# Patient Record
Sex: FEMALE | Race: WHITE | HISPANIC OR LATINO | Employment: FULL TIME | URBAN - METROPOLITAN AREA
[De-identification: names, ages, dates, MRNs, and addresses within clinical notes are randomized per-mention and may not be internally consistent; named-entity substitution may affect disease eponyms.]

---

## 2017-02-08 ENCOUNTER — HOSPITAL ENCOUNTER (OUTPATIENT)
Dept: LAB | Facility: MEDICAL CENTER | Age: 27
End: 2017-02-08
Attending: OBSTETRICS & GYNECOLOGY
Payer: COMMERCIAL

## 2017-02-08 LAB — B-HCG SERPL-ACNC: <0.6 MIU/ML (ref 0–5)

## 2017-02-08 PROCEDURE — 36415 COLL VENOUS BLD VENIPUNCTURE: CPT

## 2017-02-08 PROCEDURE — 84702 CHORIONIC GONADOTROPIN TEST: CPT

## 2018-09-04 ENCOUNTER — OFFICE VISIT (OUTPATIENT)
Dept: URGENT CARE | Facility: PHYSICIAN GROUP | Age: 28
End: 2018-09-04
Payer: COMMERCIAL

## 2018-09-04 VITALS
DIASTOLIC BLOOD PRESSURE: 86 MMHG | WEIGHT: 154 LBS | TEMPERATURE: 97.4 F | BODY MASS INDEX: 24.75 KG/M2 | HEIGHT: 66 IN | RESPIRATION RATE: 18 BRPM | SYSTOLIC BLOOD PRESSURE: 122 MMHG | HEART RATE: 90 BPM | OXYGEN SATURATION: 97 %

## 2018-09-04 DIAGNOSIS — J01.00 ACUTE MAXILLARY SINUSITIS, RECURRENCE NOT SPECIFIED: ICD-10-CM

## 2018-09-04 PROCEDURE — 99204 OFFICE O/P NEW MOD 45 MIN: CPT | Performed by: FAMILY MEDICINE

## 2018-09-04 RX ORDER — AMOXICILLIN AND CLAVULANATE POTASSIUM 875; 125 MG/1; MG/1
1 TABLET, FILM COATED ORAL 2 TIMES DAILY
Qty: 14 TAB | Refills: 0 | Status: SHIPPED | OUTPATIENT
Start: 2018-09-04 | End: 2018-09-11

## 2018-09-04 RX ORDER — FLUTICASONE PROPIONATE 50 MCG
1 SPRAY, SUSPENSION (ML) NASAL 2 TIMES DAILY
Qty: 1 BOTTLE | Refills: 0 | Status: SHIPPED | OUTPATIENT
Start: 2018-09-04 | End: 2020-03-03

## 2018-09-04 RX ORDER — IBUPROFEN 200 MG
200 TABLET ORAL EVERY 6 HOURS PRN
COMMUNITY
End: 2020-03-03

## 2018-09-04 ASSESSMENT — PAIN SCALES - GENERAL: PAINLEVEL: NO PAIN

## 2018-09-04 NOTE — LETTER
September 4, 2018         Patient: Grecia Mcgrath   YOB: 1990   Date of Visit: 9/4/2018           To Whom it May Concern:    Grecia Mcgrath was seen in my clinic on 9/4/2018. She may return to work on Monday..    If you have any questions or concerns, please don't hesitate to call.        Sincerely,           Dimitris Centeno M.D.  Electronically Signed

## 2018-09-05 NOTE — PROGRESS NOTES
"Chief Complaint   Patient presents with   • Fever     cough, [id number]         Sinusitis  This is a new problem. The current episode started in the past 7 days. The problem has been gradually worsening since onset. There has been no fever. Associated symptoms include congestion, coughing, headaches and sinus pressure. Pertinent negatives include no sneezing or sore throat. Past treatments include nothing.      Past medical history was unremarkable and not pertinent to current issue  Social hx - denies tobacco, alcohol, drug use  Family hx was reviewed - no pertinent past family hx      Review of Systems:  Review of Systems   Constitutional: Negative for fever, chills.   HENT: no neck pain,  or dizziness  Eyes: denies vision changes or discharge  Respiratory: + cough, congestion.   Denies SOB  Cardiovascular: denies palpations, chest pain   Gastrointestinal: denies diarrhea, abdominal pain or constipation.  No blood in stool.  Musculoskeletal: denies back pain or joint pain    Skin: no itching or rash  Neurological: No numbness or tingling.   Psych - denies depression, anxiety   - Denies dysuria, frequency or discharge  10 point ROS otherwise negative, except per HPI            Objective:     Blood pressure 122/86, pulse 90, temperature 36.3 °C (97.4 °F), resp. rate 18, height 1.676 m (5' 6\"), weight 69.9 kg (154 lb), SpO2 97 %.      Physical Exam   Constitutional: patient is oriented to person, place, and time. Patient appears well-developed and well-nourished.   HENT:   Head: Normocephalic and atraumatic.   Right Ear: Hearing, tympanic membrane and external ear normal.   Left Ear: Hearing, tympanic membrane and external ear normal.   Nose: Mucosal edema and rhinorrhea present.  . No epistaxis.  Left and right sinus tenderness noted  Mouth/Throat: Uvula is midline and mucous membranes are normal. Oropharyngeal exudate present. No posterior oropharyngeal edema or posterior oropharyngeal erythema.   Eyes: " Conjunctivae and EOM are normal. Pupils are equal, round, and reactive to light. Left and right eyes - no discharge. No scleral icterus.   Neck: Normal range of motion. Neck supple. No JVD present. No tracheal deviation present. No thyromegaly present.   Cardiovascular: Normal rate, regular rhythm, normal heart sounds and intact distal pulses.    No murmur heard.  Pulmonary/Chest: Breath sounds normal. No respiratory distress.   no wheezes, rales.      Musculoskeletal: Normal range of motion.   no edema.   Lymphadenopathy:     There is positive  cervical adenopathy.   Neurological:   alert and oriented to person, place, and time.   Skin: Skin is warm and dry. No erythema.   Psychiatric:   normal mood and affect.  behavior is normal.   Nursing note and vitals reviewed.          Assessment/Plan:     1. Acute maxillary sinusitis, recurrence not specified     - amoxicillin-clavulanate (AUGMENTIN) 875-125 MG Tab; Take 1 Tab by mouth 2 times a day for 7 days.  Dispense: 14 Tab; Refill: 0  - fluticasone (FLONASE) 50 MCG/ACT nasal spray; Spray 1 Spray in nose 2 times a day.  Dispense: 1 Bottle; Refill: 0      Follow up in one week if no improvement, sooner if symptoms worsen.

## 2018-09-07 ENCOUNTER — HOSPITAL ENCOUNTER (OUTPATIENT)
Dept: LAB | Facility: MEDICAL CENTER | Age: 28
End: 2018-09-07
Attending: INTERNAL MEDICINE
Payer: COMMERCIAL

## 2018-09-07 ENCOUNTER — HOSPITAL ENCOUNTER (OUTPATIENT)
Dept: LAB | Facility: MEDICAL CENTER | Age: 28
End: 2018-09-07
Payer: COMMERCIAL

## 2018-09-07 LAB
25(OH)D3 SERPL-MCNC: 20 NG/ML (ref 30–100)
ALBUMIN SERPL BCP-MCNC: 4.3 G/DL (ref 3.2–4.9)
ALBUMIN/GLOB SERPL: 1.5 G/DL
ALP SERPL-CCNC: 65 U/L (ref 30–99)
ALT SERPL-CCNC: 11 U/L (ref 2–50)
ANION GAP SERPL CALC-SCNC: 7 MMOL/L (ref 0–11.9)
AST SERPL-CCNC: 15 U/L (ref 12–45)
BASOPHILS # BLD AUTO: 0.4 % (ref 0–1.8)
BASOPHILS # BLD: 0.03 K/UL (ref 0–0.12)
BDY FAT % MEASURED: 28.3 %
BILIRUB SERPL-MCNC: 0.5 MG/DL (ref 0.1–1.5)
BP DIAS: 63 MMHG
BP SYS: 118 MMHG
BUN SERPL-MCNC: 19 MG/DL (ref 8–22)
CALCIUM SERPL-MCNC: 9.9 MG/DL (ref 8.5–10.5)
CHLORIDE SERPL-SCNC: 104 MMOL/L (ref 96–112)
CHOLEST SERPL-MCNC: 151 MG/DL (ref 100–199)
CO2 SERPL-SCNC: 26 MMOL/L (ref 20–33)
CREAT SERPL-MCNC: 0.75 MG/DL (ref 0.5–1.4)
DIABETES HTDIA: NO
EOSINOPHIL # BLD AUTO: 0.05 K/UL (ref 0–0.51)
EOSINOPHIL NFR BLD: 0.7 % (ref 0–6.9)
ERYTHROCYTE [DISTWIDTH] IN BLOOD BY AUTOMATED COUNT: 42.4 FL (ref 35.9–50)
EVENT NAME HTEVT: NORMAL
FASTING HTFAS: YES
GLOBULIN SER CALC-MCNC: 2.9 G/DL (ref 1.9–3.5)
GLUCOSE SERPL-MCNC: 92 MG/DL (ref 65–99)
GLUCOSE SERPL-MCNC: 92 MG/DL (ref 65–99)
HCT VFR BLD AUTO: 45.8 % (ref 37–47)
HDLC SERPL-MCNC: 54 MG/DL
HGB BLD-MCNC: 14.7 G/DL (ref 12–16)
HYPERTENSION HTHYP: NO
IMM GRANULOCYTES # BLD AUTO: 0.07 K/UL (ref 0–0.11)
IMM GRANULOCYTES NFR BLD AUTO: 1 % (ref 0–0.9)
LDLC SERPL CALC-MCNC: 74 MG/DL
LYMPHOCYTES # BLD AUTO: 2.32 K/UL (ref 1–4.8)
LYMPHOCYTES NFR BLD: 31.9 % (ref 22–41)
MCH RBC QN AUTO: 29.8 PG (ref 27–33)
MCHC RBC AUTO-ENTMCNC: 32.1 G/DL (ref 33.6–35)
MCV RBC AUTO: 92.9 FL (ref 81.4–97.8)
MONOCYTES # BLD AUTO: 0.48 K/UL (ref 0–0.85)
MONOCYTES NFR BLD AUTO: 6.6 % (ref 0–13.4)
NEUTROPHILS # BLD AUTO: 4.33 K/UL (ref 2–7.15)
NEUTROPHILS NFR BLD: 59.4 % (ref 44–72)
NRBC # BLD AUTO: 0 K/UL
NRBC BLD-RTO: 0 /100 WBC
PLATELET # BLD AUTO: 348 K/UL (ref 164–446)
PMV BLD AUTO: 9.7 FL (ref 9–12.9)
POTASSIUM SERPL-SCNC: 4 MMOL/L (ref 3.6–5.5)
PROLACTIN SERPL-MCNC: 36.51 NG/ML (ref 2.8–26)
PROT SERPL-MCNC: 7.2 G/DL (ref 6–8.2)
RBC # BLD AUTO: 4.93 M/UL (ref 4.2–5.4)
SCREENING LOC CITY HTCIT: NORMAL
SCREENING LOC STATE HTSTA: NORMAL
SCREENING LOCATION HTLOC: NORMAL
SMOKING HTSMO: NO
SODIUM SERPL-SCNC: 137 MMOL/L (ref 135–145)
SUBSCRIBER ID HTSID: NORMAL
T4 FREE SERPL-MCNC: 0.95 NG/DL (ref 0.53–1.43)
TRIGL SERPL-MCNC: 116 MG/DL (ref 0–149)
TSH SERPL DL<=0.005 MIU/L-ACNC: 1.34 UIU/ML (ref 0.38–5.33)
WBC # BLD AUTO: 7.3 K/UL (ref 4.8–10.8)

## 2018-09-07 PROCEDURE — 80061 LIPID PANEL: CPT

## 2018-09-07 PROCEDURE — S5190 WELLNESS ASSESSMENT BY NONPH: HCPCS

## 2018-09-07 PROCEDURE — 82306 VITAMIN D 25 HYDROXY: CPT

## 2018-09-07 PROCEDURE — 80053 COMPREHEN METABOLIC PANEL: CPT

## 2018-09-07 PROCEDURE — 82947 ASSAY GLUCOSE BLOOD QUANT: CPT

## 2018-09-07 PROCEDURE — 84443 ASSAY THYROID STIM HORMONE: CPT

## 2018-09-07 PROCEDURE — 84146 ASSAY OF PROLACTIN: CPT

## 2018-09-07 PROCEDURE — 85025 COMPLETE CBC W/AUTO DIFF WBC: CPT

## 2018-09-07 PROCEDURE — 83013 H PYLORI (C-13) BREATH: CPT

## 2018-09-07 PROCEDURE — 84439 ASSAY OF FREE THYROXINE: CPT

## 2018-09-07 PROCEDURE — 36415 COLL VENOUS BLD VENIPUNCTURE: CPT

## 2018-09-08 LAB — UREA BREATH TEST QL: NEGATIVE

## 2020-03-02 ENCOUNTER — HOSPITAL ENCOUNTER (EMERGENCY)
Facility: MEDICAL CENTER | Age: 30
End: 2020-03-03
Attending: EMERGENCY MEDICINE
Payer: MEDICAID

## 2020-03-02 ENCOUNTER — APPOINTMENT (OUTPATIENT)
Dept: RADIOLOGY | Facility: MEDICAL CENTER | Age: 30
End: 2020-03-02
Attending: EMERGENCY MEDICINE
Payer: MEDICAID

## 2020-03-02 DIAGNOSIS — O20.0 ABORTION, THREATENED: ICD-10-CM

## 2020-03-02 DIAGNOSIS — O20.9 VAGINAL BLEEDING IN PREGNANCY, FIRST TRIMESTER: ICD-10-CM

## 2020-03-02 LAB
ANION GAP SERPL CALC-SCNC: 8 MMOL/L (ref 0–11.9)
BASOPHILS # BLD AUTO: 0.5 % (ref 0–1.8)
BASOPHILS # BLD: 0.07 K/UL (ref 0–0.12)
BUN SERPL-MCNC: 20 MG/DL (ref 8–22)
CALCIUM SERPL-MCNC: 9.3 MG/DL (ref 8.5–10.5)
CHLORIDE SERPL-SCNC: 102 MMOL/L (ref 96–112)
CO2 SERPL-SCNC: 25 MMOL/L (ref 20–33)
CREAT SERPL-MCNC: 0.72 MG/DL (ref 0.5–1.4)
EOSINOPHIL # BLD AUTO: 0.09 K/UL (ref 0–0.51)
EOSINOPHIL NFR BLD: 0.7 % (ref 0–6.9)
ERYTHROCYTE [DISTWIDTH] IN BLOOD BY AUTOMATED COUNT: 43.5 FL (ref 35.9–50)
GLUCOSE SERPL-MCNC: 85 MG/DL (ref 65–99)
HCT VFR BLD AUTO: 39.2 % (ref 37–47)
HGB BLD-MCNC: 13.1 G/DL (ref 12–16)
IMM GRANULOCYTES # BLD AUTO: 0.09 K/UL (ref 0–0.11)
IMM GRANULOCYTES NFR BLD AUTO: 0.7 % (ref 0–0.9)
LYMPHOCYTES # BLD AUTO: 3.27 K/UL (ref 1–4.8)
LYMPHOCYTES NFR BLD: 23.6 % (ref 22–41)
MCH RBC QN AUTO: 30.8 PG (ref 27–33)
MCHC RBC AUTO-ENTMCNC: 33.4 G/DL (ref 33.6–35)
MCV RBC AUTO: 92.2 FL (ref 81.4–97.8)
MONOCYTES # BLD AUTO: 1.05 K/UL (ref 0–0.85)
MONOCYTES NFR BLD AUTO: 7.6 % (ref 0–13.4)
NEUTROPHILS # BLD AUTO: 9.27 K/UL (ref 2–7.15)
NEUTROPHILS NFR BLD: 66.9 % (ref 44–72)
NRBC # BLD AUTO: 0 K/UL
NRBC BLD-RTO: 0 /100 WBC
NUMBER OF RH DOSES IND 8505RD: NORMAL
PLATELET # BLD AUTO: 336 K/UL (ref 164–446)
PMV BLD AUTO: 9.5 FL (ref 9–12.9)
POTASSIUM SERPL-SCNC: 3.6 MMOL/L (ref 3.6–5.5)
RBC # BLD AUTO: 4.25 M/UL (ref 4.2–5.4)
RH BLD: NORMAL
SODIUM SERPL-SCNC: 135 MMOL/L (ref 135–145)
WBC # BLD AUTO: 13.8 K/UL (ref 4.8–10.8)

## 2020-03-02 PROCEDURE — 84702 CHORIONIC GONADOTROPIN TEST: CPT

## 2020-03-02 PROCEDURE — 85025 COMPLETE CBC W/AUTO DIFF WBC: CPT

## 2020-03-02 PROCEDURE — 76801 OB US < 14 WKS SINGLE FETUS: CPT

## 2020-03-02 PROCEDURE — 80048 BASIC METABOLIC PNL TOTAL CA: CPT

## 2020-03-02 PROCEDURE — 86901 BLOOD TYPING SEROLOGIC RH(D): CPT

## 2020-03-02 PROCEDURE — 99284 EMERGENCY DEPT VISIT MOD MDM: CPT

## 2020-03-02 ASSESSMENT — PAIN DESCRIPTION - DESCRIPTORS: DESCRIPTORS: CRAMPING;ACHING

## 2020-03-02 ASSESSMENT — FIBROSIS 4 INDEX: FIB4 SCORE: 0.4

## 2020-03-03 ENCOUNTER — APPOINTMENT (OUTPATIENT)
Dept: RADIOLOGY | Facility: MEDICAL CENTER | Age: 30
End: 2020-03-03
Attending: EMERGENCY MEDICINE
Payer: COMMERCIAL

## 2020-03-03 ENCOUNTER — HOSPITAL ENCOUNTER (EMERGENCY)
Facility: MEDICAL CENTER | Age: 30
End: 2020-03-03
Attending: EMERGENCY MEDICINE
Payer: COMMERCIAL

## 2020-03-03 VITALS
OXYGEN SATURATION: 97 % | RESPIRATION RATE: 15 BRPM | TEMPERATURE: 98.1 F | SYSTOLIC BLOOD PRESSURE: 129 MMHG | BODY MASS INDEX: 26.23 KG/M2 | HEIGHT: 67 IN | HEART RATE: 89 BPM | WEIGHT: 167.11 LBS | DIASTOLIC BLOOD PRESSURE: 75 MMHG

## 2020-03-03 VITALS
DIASTOLIC BLOOD PRESSURE: 65 MMHG | RESPIRATION RATE: 18 BRPM | HEIGHT: 67 IN | BODY MASS INDEX: 26.54 KG/M2 | SYSTOLIC BLOOD PRESSURE: 126 MMHG | OXYGEN SATURATION: 99 % | WEIGHT: 169.09 LBS | HEART RATE: 82 BPM | TEMPERATURE: 98.6 F

## 2020-03-03 DIAGNOSIS — O03.4 INEVITABLE ABORTION: ICD-10-CM

## 2020-03-03 LAB
B-HCG SERPL-ACNC: 7497 MIU/ML (ref 0–10)
B-HCG SERPL-ACNC: ABNORMAL MIU/ML (ref 0–5)

## 2020-03-03 PROCEDURE — 84702 CHORIONIC GONADOTROPIN TEST: CPT

## 2020-03-03 PROCEDURE — 36415 COLL VENOUS BLD VENIPUNCTURE: CPT

## 2020-03-03 PROCEDURE — 76801 OB US < 14 WKS SINGLE FETUS: CPT

## 2020-03-03 PROCEDURE — 99284 EMERGENCY DEPT VISIT MOD MDM: CPT

## 2020-03-03 ASSESSMENT — LIFESTYLE VARIABLES: DO YOU DRINK ALCOHOL: NO

## 2020-03-03 ASSESSMENT — FIBROSIS 4 INDEX: FIB4 SCORE: 0.42

## 2020-03-03 NOTE — ED PROVIDER NOTES
"ED Provider Note    Scribed for Agustín Pelaez M.D. by Agustín Pelaez M.D.. 3/2/2020,  10:05 PM.    CHIEF COMPLAINT  Chief Complaint   Patient presents with   • Vaginal Bleeding     Pt states she is experiencing vaginal bleeding, it appears dark in color \"like old blood.\"  Bleeding started about 1 hour ago.     • Pregnancy     Pt states she is 7 weeks pregnant.     • Abdominal Cramping     abd cramping and back pain has been occurring x 48 hours.       HPI  Grecia Mcgrath is a 29 y.o. female who presents to the Emergency Department out of concern for vaginal bleeding and discomfort in first trimester pregnancy.  Her last menstrual period was December 7.  She is a G1, P0.  She said that she has had a day or 2 low back soreness and low midline abdominal cramping, for the most 48 hours.  The cramps are about the same as a menstrual cycle.  Today, in the bathroom, she noticed a small streak of dark blood on her underwear.  This was about 1 hour prior to arrival.  She does not think she has passed any clots or tissue.  She has not had any bright blood.  She denies fevers or chills or nausea or vomiting, abdominal trauma, abdominal surgeries, constipation or diarrhea or dysuria.    REVIEW OF SYSTEMS  See HPI for further details. All other systems are negative.     PAST MEDICAL HISTORY   Gastric ulcers.  No diabetes or immunosuppression.    SOCIAL HISTORY  Social History     Tobacco Use   • Smoking status: Never Smoker   • Smokeless tobacco: Never Used   Substance and Sexual Activity   • Alcohol use: No   • Drug use: Never   • Sexual activity: Not on file     Social History     Substance and Sexual Activity   Drug Use Never       SURGICAL HISTORY  patient denies any surgical history    CURRENT MEDICATIONS  Home Medications     Reviewed by Heather Beach R.N. (Registered Nurse) on 03/02/20 at 2130  Med List Status: Not Addressed   Medication Last Dose Status   fluticasone (FLONASE) 50 MCG/ACT nasal spray not " "taking Active   ibuprofen (MOTRIN) 200 MG Tab not taking Active                ALLERGIES  No Known Allergies    PHYSICAL EXAM  VITAL SIGNS: /65   Pulse 82   Temp 37 °C (98.6 °F)   Resp 18   Ht 1.702 m (5' 7\")   Wt 76.7 kg (169 lb 1.5 oz)   SpO2 99%   BMI 26.48 kg/m²   Pulse ox interpretation: I interpret this pulse ox as normal.  Constitutional: Alert in no apparent distress.  HENT: No signs of trauma, Bilateral external ears normal, Nose normal.   Eyes: Conjunctiva normal, Non-icteric.   Neck: Normal range of motion, Supple, No stridor.   Lymphatic: No lymphadenopathy noted.   Cardiovascular: Regular rate and rhythm, no murmurs.   Thorax & Lungs: Normal breath sounds, No respiratory distress, No wheezing, No chest tenderness.   Abdomen: Bowel sounds normal, Soft, mild suprapubic tenderness, No masses, No pulsatile masses. No peritoneal signs.  Skin: Warm, Dry, No erythema, No rash.   Extremities: Intact distal pulses, No edema, No cyanosis.  Musculoskeletal: Good range of motion in all major joints. No or major deformities noted.   Neurologic: Alert , Normal motor function, Normal sensory function, No focal deficits noted.   Psychiatric: Affect normal, Judgment normal, Mood normal.     DIAGNOSTIC STUDIES / PROCEDURES    LABS  Labs Reviewed   CBC WITH DIFFERENTIAL - Abnormal; Notable for the following components:       Result Value    WBC 13.8 (*)     MCHC 33.4 (*)     Neutrophils (Absolute) 9.27 (*)     Monos (Absolute) 1.05 (*)     All other components within normal limits    Narrative:     Print Consent?->No   HCG QUANTITATIVE - Abnormal; Notable for the following components:    Bhcg 87287.6 (*)     All other components within normal limits    Narrative:     Print Consent?->No   BASIC METABOLIC PANEL    Narrative:     Print Consent?->No   RH TYPE FOR RHOGAM FROM E.D.    Narrative:     Print Consent?->No   ESTIMATED GFR    Narrative:     Print Consent?->No     All labs reviewed by " me.    RADIOLOGY  US-OB 1ST TRIMESTER WITH TRANSVAGINAL (COMBO)   Final Result      Intrauterine embryo which by crown-rump length is six weeks one day gestational age. No fetal heart motions were detected however, raising the possibility of demise.        The radiologist's interpretation of all radiological studies have been reviewed by me.    COURSE & MEDICAL DECISION MAKING  Nursing notes, VS, PMSFHx reviewed in chart.     10:05 PM Patient seen and examined at bedside. Differential diagnosis includes but is not limited to incomplete or inevitable miscarriage, first trimester bleeding, abdominal pain and pregnancy. Ordered for screening labs, and transvaginal ultrasound to evaluate.     12:27 AM this patient's ultrasound is concerning for an inevitable .  I returned to the bedside, and explained her that she has an intrauterine pregnancy, but at this point in her pregnancy, should have a visible heartbeat, and does not, and that this most likely suggests a miscarriage, and that she can expect temporary heavier bleeding.  We reviewed return precautions for prolonged heavy bleeding, severe abdominal pain, uncontrolled vomiting, or lightheadedness or dizziness.  Otherwise, she will call tomorrow to schedule follow-up with the pregnancy center.     The patient will return for new or worsening symptoms and is stable at the time of discharge.    The patient is referred to a primary physician for blood pressure management, diabetic screening, and for all other preventative health concerns.    DISPOSITION:  Patient will be discharged home in stable condition.    FOLLOW UP:  [facility]  [address] [place] [id number]  [phone]  Schedule an appointment as soon as possible for a visit         OUTPATIENT MEDICATIONS:  Discharge Medication List as of 3/3/2020 12:31 AM            FINAL IMPRESSION  1. Vaginal bleeding in pregnancy, first trimester    2. , threatened

## 2020-03-03 NOTE — DISCHARGE INSTRUCTIONS
Your ultrasound does show a pregnancy in your uterus, but we do not see a heart rate.  This could mean that you are having a miscarriage, but it is too early to tell. You may have more bleeding at home. Return here for heavy bleeding with severe pain, dizziness, or which lasts more than an hour. Call to schedule follow-up with the [facility].

## 2020-03-03 NOTE — ED NOTES
Patient updated that we are waiting for one lab to result and the physician will be with her once it is back.

## 2020-03-03 NOTE — ED PROVIDER NOTES
ED Provider Note    CHIEF COMPLAINT  Chief Complaint   Patient presents with   • Vaginal Bleeding     vaginal bleeding  Pregnant  Seen at [facility] last night  Had ultrasound       HPI  Grecia Mcgrath is a 29 y.o. female who presents for evaluation of vaginal bleeding.  The patient was seen at [facility] last night for the same symptoms.  She states that she found out a week ago that she was pregnant.  She started having bleeding and went in for evaluation.  She was found to be Rh+ and had a quantitative beta-hCG of 14,000.  Pelvic ultrasound demonstrated a 6-week intrauterine pregnancy however there was no fetal activity concerning for possible fetal demise.  The patient states that today she is had increased cramping and bleeding.  Her bleeding now is like a normal period.  She has not been seen by an OB/GYN for this pregnancy.    REVIEW OF SYSTEMS  See HPI for further details. All other systems negative.    PAST MEDICAL HISTORY  History reviewed. No pertinent past medical history.    FAMILY HISTORY  History reviewed. No pertinent family history.    SOCIAL HISTORY  Social History     Socioeconomic History   • Marital status: Single     Spouse name: Not on file   • Number of children: Not on file   • Years of education: Not on file   • Highest education level: Not on file   Occupational History   • Not on file   Social Needs   • Financial resource strain: Not on file   • Food insecurity     Worry: Not on file     Inability: Not on file   • Transportation needs     Medical: Not on file     Non-medical: Not on file   Tobacco Use   • Smoking status: Never Smoker   • Smokeless tobacco: Never Used   Substance and Sexual Activity   • Alcohol use: No   • Drug use: Never   • Sexual activity: Not on file   Lifestyle   • Physical activity     Days per week: Not on file     Minutes per session: Not on file   • Stress: Not on file   Relationships   • Social connections     Talks on phone: Not on file     Gets  "together: Not on file     Attends Sikhism service: Not on file     Active member of club or organization: Not on file     Attends meetings of clubs or organizations: Not on file     Relationship status: Not on file   • Intimate partner violence     Fear of current or ex partner: Not on file     Emotionally abused: Not on file     Physically abused: Not on file     Forced sexual activity: Not on file   Other Topics Concern   • Not on file   Social History Narrative   • Not on file       SURGICAL HISTORY  History reviewed. No pertinent surgical history.    CURRENT MEDICATIONS  Home Medications     Reviewed by Mary Paulino (Pharmacy Tech) on 03/03/20 at 1152  Med List Status: Complete   Medication Last Dose Status   Prenatal MV-Min-FA-Omega-3 (PRENATAL GUMMIES/DHA & FA PO) 3/3/2020 Active                ALLERGIES  No Known Allergies    PHYSICAL EXAM  VITAL SIGNS: /77   Pulse 70   Temp 36.7 °C (98.1 °F) (Oral)   Resp 18   Ht 1.702 m (5' 7\")   Wt 75.8 kg (167 lb 1.7 oz)   LMP 01/07/2020   SpO2 98%   BMI 26.17 kg/m²   Constitutional: Well developed, Well nourished, No acute distress, Non-toxic appearance.   HENT: Normocephalic, Atraumatic.  Eyes:  EOMI, Conjunctiva normal, No discharge.   Cardiovascular: Normal heart rate.   Thorax & Lungs: No respiratory distress.  Skin: Warm, Dry.  Musculoskeletal: Good range of motion in all major joints.   Neurologic: Awake and alert, No focal deficits noted.       RADIOLOGY/PROCEDURES  US-OB 1ST TRIMESTER WITH TRANSVAGINAL (COMBO)   Final Result         The gestational sac is more low lying and irregular with irregular shaped yolk sac. Previous fetal pole is no longer seen, indicative of early embryonic demise.      Demise can be confirmed with repeated scanning and serial quantitative beta-HCG.                  COURSE & MEDICAL DECISION MAKING  Pertinent Labs & Imaging studies reviewed. (See chart for details)  This 29-year-old here for evaluation of " vaginal bleeding and pregnancy.  She was seen yesterday at [facility] and was found to have a 6-week size intrauterine pregnancy with no cardiac activity.  This was concerning for fetal demise.  She had a quantitative beta-hCG of 14,000.  Today her quantitative is 7500.  Pelvic ultrasound this day shows the gestational sac is more low-lying and irregular in shape with an irregularly shaped yolk sac.  Previous fetal pole is no longer seen.  I discussed the results of the studies with the patient and her sister.  I have explained that I believe this represents an inevitable  at this point.  She does not require hospitalization.  I requested that she follow-up with the pregnancy center since she is not as yet been seen by an OB/GYN.  She will be given discharge instructions on miscarriages.  If she is having any difficulty whatsoever she should go directly to [facility] for reevaluation.    FINAL IMPRESSION  1.  Inevitable   2.   3.         Electronically signed by: Kang Paez M.D., 3/3/2020 12:00 PM

## 2020-03-03 NOTE — ED NOTES
Patient educated on discharge instructions, verbalized understanding. No IV in place. Pt ambulated steadily and independently from ED, belongings w patient.

## 2020-03-03 NOTE — ED TRIAGE NOTES
"Chief Complaint   Patient presents with   • Vaginal Bleeding     Pt states she is experiencing vaginal bleeding, it appears dark in color \"like old blood.\"  Bleeding started about 1 hour ago.     • Pregnancy     Pt states she is 7 weeks pregnant.     • Abdominal Cramping     abd cramping and back pain has been occurring x 48 hours.     Pt amb to triage with steady gait for above complaint.  She appears in discomofrt due to abdominal cramping.    Pt is alert and oriented, speaking in full sentences, follows commands and responds appropriately to questions. NAD. Resp are even and unlabored.    Pt placed in lobby. Pt educated on triage process. Pt encouraged to alert staff for any changes.    "

## 2020-03-04 ENCOUNTER — INITIAL PRENATAL (OUTPATIENT)
Dept: OBGYN | Facility: CLINIC | Age: 30
End: 2020-03-04
Payer: COMMERCIAL

## 2020-03-04 VITALS
DIASTOLIC BLOOD PRESSURE: 88 MMHG | HEIGHT: 67 IN | WEIGHT: 165.8 LBS | SYSTOLIC BLOOD PRESSURE: 130 MMHG | BODY MASS INDEX: 26.02 KG/M2

## 2020-03-04 DIAGNOSIS — O03.4 INCOMPLETE ABORTION: ICD-10-CM

## 2020-03-04 PROCEDURE — 99203 OFFICE O/P NEW LOW 30 MIN: CPT | Performed by: OBSTETRICS & GYNECOLOGY

## 2020-03-04 RX ORDER — MISOPROSTOL 200 UG/1
800 TABLET ORAL ONCE
Qty: 4 TAB | Refills: 0 | Status: SHIPPED | OUTPATIENT
Start: 2020-03-04 | End: 2020-03-04

## 2020-03-04 ASSESSMENT — FIBROSIS 4 INDEX: FIB4 SCORE: 0.42

## 2020-03-04 NOTE — PROGRESS NOTES
Cc: Confirmation of pregnancy    HPI:  The patient is a 29 y.o.  7w6d based upon  Patient's last menstrual period was 2020 (exact date).. She was using no birth control method. This was a planned pregnancy.    Patient was seen in the emergency room on  and 3.  She was found to have an irregular shaped yolk sac, no fetal pole was seen, approximately care home between the uterine fundus and cervix.  hCG levels dropped from 13,911 on 2022 7497 on March 3, 2020.  She continues to be passing clots and having some vaginal bleeding for the last 3 days.  Positive cramping as well.    She presents for a confirmation of pregnancy.  She denies  fetal movement,  reports  vaginal bleeding,  denies  leakage of fluid,  reports contractions.   She denies nausea/vomiting, denies headache, and denies dysuria.      Review of systems:  Pertinent positives documented in HPI and all other systems reviewed & are negative    OB History    Para Term  AB Living   1             SAB TAB Ectopic Molar Multiple Live Births                    # Outcome Date GA Lbr Chuck/2nd Weight Sex Delivery Anes PTL Lv   1 Current              Past Medical History:   Diagnosis Date   • Anemia    • GERD (gastroesophageal reflux disease)    • Ulcer of abdomen wall (HCC)      History reviewed. No pertinent surgical history.  Social History     Socioeconomic History   • Marital status: Single     Spouse name: Not on file   • Number of children: Not on file   • Years of education: Not on file   • Highest education level: Not on file   Occupational History   • Not on file   Social Needs   • Financial resource strain: Not on file   • Food insecurity     Worry: Not on file     Inability: Not on file   • Transportation needs     Medical: Not on file     Non-medical: Not on file   Tobacco Use   • Smoking status: Never Smoker   • Smokeless tobacco: Never Used   Substance and Sexual Activity   • Alcohol use: No   • Drug use: Never   •  "Sexual activity: Yes     Partners: Male   Lifestyle   • Physical activity     Days per week: Not on file     Minutes per session: Not on file   • Stress: Not on file   Relationships   • Social connections     Talks on phone: Not on file     Gets together: Not on file     Attends Hinduism service: Not on file     Active member of club or organization: Not on file     Attends meetings of clubs or organizations: Not on file     Relationship status: Not on file   • Intimate partner violence     Fear of current or ex partner: Not on file     Emotionally abused: Not on file     Physically abused: Not on file     Forced sexual activity: Not on file   Other Topics Concern   • Not on file   Social History Narrative   • Not on file     Family History   Problem Relation Age of Onset   • Other Mother         sleap apnea   • No Known Problems Father    • Cancer Maternal Grandmother         stomach   • Heart Disease Paternal Grandfather    • No Known Problems Sister      Allergies:   Allergies as of 2020   • (No Known Allergies)       PE:    /88   Ht 1.702 m (5' 7\")   Wt 75.2 kg (165 lb 12.8 oz)       General:appears stated age  Head: normocephalic, non-tender  Neck: neck is supple  Abdomen: Bowel sounds positive, nondistended, soft, nontender x4, no rebound or guarding. No organomegaly. No masses.  Female GYN: normal female external genitalia without lesionsnormal external genitalia, no erythema, no discharge  Skin: No rashes, or ulcers or lesions seen  Psychiatric: Patient shows appropriate affect, is alert and oriented x3, intact judgment and insight.    TVUS performed and per my read:    Indication: viability    Findings: Irregularly-shaped gestational sac noted in the lower uterine segment.  No fetal pole or fetal parts identified.  No fetal heart tones noted  Right ovary normal. Left Ovary normal. Cervical length 3.8 cm. No free fluid in the cul-de-sac.    Impression: Incomplete       A/P:   1. " Incomplete          1. Spent 15 minutes in face-to-face patient contact in which greater than 50% of that visit was spent in counseling/coordination of care of newly incomplete    2.  Options for treatment were discussed the patient, expectant management versus medical management versus surgical management with D&C.    Recommendations for medical management with Cytotec were made at this time given the fact that she is in the process of expelling the pros of conception.  I do believe she has a high rate of success from this type of treatment    Given the vaginal bleeding, recommendations for sublingual Cytotec were made to the patient instead of vaginal Cytotec.  She will follow-up in 1 week.  If no passage of products of conception, will discuss D&C at that time    Patient is Rh+    All questions answered    , Side effect associated with Cytotec use were discussed with the patient, mainly risk of heavy vaginal bleeding, nausea, vomiting, abdominal pain, fever, chills and uterine cramping.

## 2020-03-04 NOTE — PROGRESS NOTES
Patient here for  scan  LMP: 1/7/2020  ISAAC: 10/13/2020  GA: 8w1d  Last pap:   #   Pharmacy verified   Pt states went to [facility] ER on 3/3/2020 for vaginal bleeding.   Pt states just passed a blood clot in the bathroom in our clinic while she was  Emptying out her bladder.

## 2020-03-05 ENCOUNTER — TELEPHONE (OUTPATIENT)
Dept: OBGYN | Facility: CLINIC | Age: 30
End: 2020-03-05

## 2020-03-05 NOTE — TELEPHONE ENCOUNTER
Pt LM on VM stating Rx was sent to pharmacy but pharmacy does not have it on file.  Called pt, unable to reach her. LMTCB  1115 Pt called back and stated pharmacy does not have Rx on file. Called [facility] pharmacy, spoke with Gustavo-pharmacist and called in Rx, dispensing 8 tabs in case pt needs to repeat (ok'd by Dr. Valentino) Gustavo notified and will get Rx ready. Pt informed and given instructions again. Pt understood and had no other questions

## 2020-03-12 ENCOUNTER — HOSPITAL ENCOUNTER (OUTPATIENT)
Dept: LAB | Facility: MEDICAL CENTER | Age: 30
End: 2020-03-12
Attending: OBSTETRICS & GYNECOLOGY
Payer: COMMERCIAL

## 2020-03-12 ENCOUNTER — GYNECOLOGY VISIT (OUTPATIENT)
Dept: OBGYN | Facility: CLINIC | Age: 30
End: 2020-03-12
Payer: COMMERCIAL

## 2020-03-12 VITALS — BODY MASS INDEX: 26.09 KG/M2 | WEIGHT: 166.6 LBS | DIASTOLIC BLOOD PRESSURE: 58 MMHG | SYSTOLIC BLOOD PRESSURE: 112 MMHG

## 2020-03-12 DIAGNOSIS — O03.9 MISCARRIAGE: ICD-10-CM

## 2020-03-12 LAB — B-HCG SERPL-ACNC: 127.7 MIU/ML (ref 0–5)

## 2020-03-12 PROCEDURE — 84702 CHORIONIC GONADOTROPIN TEST: CPT

## 2020-03-12 PROCEDURE — 99213 OFFICE O/P EST LOW 20 MIN: CPT | Performed by: OBSTETRICS & GYNECOLOGY

## 2020-03-12 PROCEDURE — 36415 COLL VENOUS BLD VENIPUNCTURE: CPT

## 2020-03-12 ASSESSMENT — FIBROSIS 4 INDEX: FIB4 SCORE: 0.42

## 2020-03-12 NOTE — PROGRESS NOTES
Chief Complaint   Patient presents with   • Gynecologic Exam   Chief complaint: Follow-up miscarriage      History of present illness: 29 y.o. presents to office for follow-up after miscarriage.  Patient use Cytotec on March 5, 2020.  Reports he did pass some tissue.  Has some bleeding for 2 days but no further bleeding since then.  No pain, no fevers      Review of systems:  Pertinent positives documented in HPI and a comprehensive review of system is negative    All PMH, PSH, allergies, social history and FH reviewed and updated today:  Past Medical History:   Diagnosis Date   • Anemia    • GERD (gastroesophageal reflux disease)    • Ulcer of abdomen wall (HCC)        No past surgical history on file.    Allergies: No Known Allergies    Social History     Socioeconomic History   • Marital status: Single     Spouse name: Not on file   • Number of children: Not on file   • Years of education: Not on file   • Highest education level: Not on file   Occupational History   • Not on file   Social Needs   • Financial resource strain: Not on file   • Food insecurity     Worry: Not on file     Inability: Not on file   • Transportation needs     Medical: Not on file     Non-medical: Not on file   Tobacco Use   • Smoking status: Never Smoker   • Smokeless tobacco: Never Used   Substance and Sexual Activity   • Alcohol use: No   • Drug use: Never   • Sexual activity: Yes     Partners: Male   Lifestyle   • Physical activity     Days per week: Not on file     Minutes per session: Not on file   • Stress: Not on file   Relationships   • Social connections     Talks on phone: Not on file     Gets together: Not on file     Attends Nondenominational service: Not on file     Active member of club or organization: Not on file     Attends meetings of clubs or organizations: Not on file     Relationship status: Not on file   • Intimate partner violence     Fear of current or ex partner: Not on file     Emotionally abused: Not on file      Physically abused: Not on file     Forced sexual activity: Not on file   Other Topics Concern   • Not on file   Social History Narrative   • Not on file       Family History   Problem Relation Age of Onset   • Other Mother         sleap apnea   • No Known Problems Father    • Cancer Maternal Grandmother         stomach   • Heart Disease Paternal Grandfather    • No Known Problems Sister        Physical exam:  /58   Wt 75.6 kg (166 lb 9.6 oz)     GENERAL APPEARANCE: healthy, alert, no distress  NECK nontender, no masses, thyromegaly or nodules  ABDOMEN Abdomen soft, non-tender. BS normal. No masses,  No organomegaly  FEMALE GYN: normal female external genitalia without lesions, BUS normal, no vaginal discharge noted, vulva pink without erythema or friability, urethra is normal without discharge or scarring, no bladder fullness or masses, normal external genitalia, no erythema, no discharge, normal anus and perineum.  NEURO Awake, alert and oriented x 3, Normal gait, no sensory deficits  SKIN No rashes, or ulcers or lesions seen  PSYCHIATRIC: Patient shows appropriate affect, is alert and oriented x3, intact judgment and insight.      Transvaginal ultrasound performed by myself.    This shows a normal-appearing uterus with no products of conception noted within the cavity.  Small amount of blood noted within the endometrium.  No large clots were seen.    Bilateral normal ovaries.  No free pelvic fluid    Assessment:  1. Miscarriage  HCG QUANTITATIVE       Plan:    No evidence of retained proximal conception.  Will follow hCG levels until negative.    Follow-up as needed     Questions answered    Spent  15 minutes in face-to-face patient contact in which greater than 50% of that visit was spent in counseling/coordination of care including medical and surgical options of care.

## 2020-03-12 NOTE — PROGRESS NOTES
Pt here for follow up on SAB   # [phone]  Pt states took Cytotec on 3/5/2020 states that night had some tissue that passed.   LMP 1/7/2020  GA 9w2d  ISAAC 10/13/2020

## 2020-10-10 ENCOUNTER — OFFICE VISIT (OUTPATIENT)
Dept: URGENT CARE | Facility: CLINIC | Age: 30
End: 2020-10-10
Payer: COMMERCIAL

## 2020-10-10 ENCOUNTER — HOSPITAL ENCOUNTER (OUTPATIENT)
Facility: MEDICAL CENTER | Age: 30
End: 2020-10-10
Attending: PHYSICIAN ASSISTANT
Payer: COMMERCIAL

## 2020-10-10 VITALS
TEMPERATURE: 99.8 F | BODY MASS INDEX: 26.2 KG/M2 | SYSTOLIC BLOOD PRESSURE: 110 MMHG | WEIGHT: 163 LBS | HEART RATE: 90 BPM | DIASTOLIC BLOOD PRESSURE: 78 MMHG | OXYGEN SATURATION: 98 % | HEIGHT: 66 IN | RESPIRATION RATE: 12 BRPM

## 2020-10-10 DIAGNOSIS — J02.9 PHARYNGITIS, UNSPECIFIED ETIOLOGY: ICD-10-CM

## 2020-10-10 LAB
INT CON NEG: NORMAL
INT CON POS: NORMAL
S PYO AG THROAT QL: NORMAL

## 2020-10-10 PROCEDURE — 87880 STREP A ASSAY W/OPTIC: CPT | Performed by: PHYSICIAN ASSISTANT

## 2020-10-10 PROCEDURE — 99214 OFFICE O/P EST MOD 30 MIN: CPT | Performed by: PHYSICIAN ASSISTANT

## 2020-10-10 PROCEDURE — 87070 CULTURE OTHR SPECIMN AEROBIC: CPT

## 2020-10-10 ASSESSMENT — ENCOUNTER SYMPTOMS
CHILLS: 0
DIZZINESS: 0
SHORTNESS OF BREATH: 0
SWOLLEN GLANDS: 0
MYALGIAS: 0
STRIDOR: 0
FEVER: 0
COUGH: 0
NECK PAIN: 0
NAUSEA: 0
SORE THROAT: 1
TROUBLE SWALLOWING: 1
HOARSE VOICE: 0
BLURRED VISION: 0
DIARRHEA: 0
HEADACHES: 1
VOMITING: 0
ABDOMINAL PAIN: 0
EYE PAIN: 0
SINUS PAIN: 0
PALPITATIONS: 0

## 2020-10-10 NOTE — PROGRESS NOTES
Subjective:      Grecia Mcgrath is a 29 y.o. female who presents with Pharyngitis (headache, both ear pain , x yesterday )      Pharyngitis   This is a new problem. The current episode started yesterday. The problem has been gradually improving. Neither side of throat is experiencing more pain than the other. There has been no fever. The pain is moderate. Associated symptoms include congestion, ear pain, headaches, a plugged ear sensation and trouble swallowing. Pertinent negatives include no abdominal pain, coughing, diarrhea, drooling, hoarse voice, neck pain, shortness of breath, stridor, swollen glands or vomiting. Exposure to: Patient states that her niece was sick with some type of a throat infection earlier this week but she is not sure if it was strep or not. She has tried gargles for the symptoms. The treatment provided no relief.   Patient is 3 months pregnant.  States that she try to do some salt water in her mouth as well as drink tea with honey but both of these things made her nauseous and she threw up afterwards.  She has not tried any Tylenol or any other medications because of the pregnancy.  States that symptoms started last night with bilateral ear pain, headache, and sore throat.  She says the sore throat was much more severe last night and today it is gradually improving.    Review of Systems   Constitutional: Negative for chills, fever and malaise/fatigue.   HENT: Positive for congestion, ear pain, sore throat and trouble swallowing. Negative for drooling, hoarse voice and sinus pain.    Eyes: Negative for blurred vision and pain.   Respiratory: Negative for cough, shortness of breath and stridor.    Cardiovascular: Negative for chest pain and palpitations.   Gastrointestinal: Negative for abdominal pain, diarrhea, nausea and vomiting.   Musculoskeletal: Negative for myalgias and neck pain.   Skin: Negative for rash.   Neurological: Positive for headaches. Negative for dizziness.       PMH:  has  "a past medical history of Anemia, GERD (gastroesophageal reflux disease), and Ulcer of abdomen wall (HCC). She also has no past medical history of Addisons disease (HCC), Adrenal disorder (HCC), Allergy, Anxiety, Arrhythmia, Arthritis, Asthma, Blood transfusion without reported diagnosis, Cancer (HCC), Cataract, CHF (congestive heart failure) (HCC), Clotting disorder (HCC), COPD (chronic obstructive pulmonary disease) (HCC), Cushings syndrome (HCC), Depression, Diabetes (HCC), Diabetic neuropathy (HCC), Glaucoma, Goiter, Head ache, Heart attack (HCC), Heart murmur, HIV (human immunodeficiency virus infection) (HCC), Hyperlipidemia, Hypertension, IBD (inflammatory bowel disease), Kidney disease, Meningitis, Migraine, Muscle disorder, Osteoporosis, Parathyroid disorder (HCC), Pituitary disease (HCC), Pulmonary emphysema (HCC), Seizure (HCC), Sickle cell disease (HCC), Stroke (HCC), Substance abuse (HCC), Thyroid disease, Tuberculosis, or Urinary tract infection.  MEDS:   Current Outpatient Medications:   •  Prenatal MV-Min-FA-Omega-3 (PRENATAL GUMMIES/DHA & FA PO), Take 2 Tabs by mouth every day., Disp: , Rfl:   ALLERGIES: No Known Allergies  SURGHX: History reviewed. No pertinent surgical history.  SOCHX:  reports that she has never smoked. She has never used smokeless tobacco. She reports that she does not drink alcohol or use drugs.  FH: Family history was reviewed, no pertinent findings to report     Objective:     /78   Pulse 90   Temp 37.7 °C (99.8 °F) (Temporal)   Resp 12   Ht 1.676 m (5' 6\")   Wt 73.9 kg (163 lb)   LMP 01/09/2020 (Exact Date)   SpO2 98%   BMI 26.31 kg/m²      Physical Exam  Constitutional:       Appearance: She is well-developed.   HENT:      Head: Normocephalic and atraumatic.      Right Ear: External ear normal.      Left Ear: External ear normal.   Eyes:      Conjunctiva/sclera: Conjunctivae normal.      Pupils: Pupils are equal, round, and reactive to light.   Neck:      " Musculoskeletal: Normal range of motion.   Cardiovascular:      Rate and Rhythm: Normal rate and regular rhythm.      Heart sounds: Normal heart sounds. No murmur.   Pulmonary:      Effort: Pulmonary effort is normal.      Breath sounds: Normal breath sounds. No wheezing.   Lymphadenopathy:      Cervical: No cervical adenopathy.   Skin:     General: Skin is warm and dry.      Capillary Refill: Capillary refill takes less than 2 seconds.   Neurological:      Mental Status: She is alert and oriented to person, place, and time.   Psychiatric:         Behavior: Behavior normal.         Judgment: Judgment normal.           POCT Rapid Strep A - Negative     Assessment/Plan:     1. Pharyngitis, unspecified etiology  - POCT Rapid Strep A  - CULTURE THROAT; Future  *Rapid strep is negative but due to patient's possible exposure we will do a throat culture to verify.  Supportive care discussed in the interim to include salt water gargles, throat lozenges, and increased fluid intake.  Also discussed that she can use Tylenol for the headache or if her throat pain becomes very bothersome.  Discussed that if her symptoms worsen over the weekend or she develops additional symptoms that she should return to the urgent care for reevaluation on Monday for possible COVID testing at that time.  Did not recommend that we test for COVID today as she has had symptoms for less than 24 hours.            Differential Diagnosis, natural history, and supportive care discussed. Return to the Urgent Care or follow up with your PCP if symptoms fail to resolve, or for any new or worsening symptoms. Emergency room precautions discussed. Patient and/or family appears understanding of information.

## 2020-10-13 LAB
BACTERIA SPEC RESP CULT: NORMAL
SIGNIFICANT IND 70042: NORMAL
SITE SITE: NORMAL
SOURCE SOURCE: NORMAL

## 2020-11-03 ENCOUNTER — HOSPITAL ENCOUNTER (OUTPATIENT)
Dept: LAB | Facility: MEDICAL CENTER | Age: 30
End: 2020-11-03
Attending: OBSTETRICS & GYNECOLOGY
Payer: COMMERCIAL

## 2020-11-03 LAB
ABO GROUP BLD: NORMAL
APPEARANCE UR: ABNORMAL
BACTERIA #/AREA URNS HPF: ABNORMAL /HPF
BASOPHILS # BLD AUTO: 0.5 % (ref 0–1.8)
BASOPHILS # BLD: 0.06 K/UL (ref 0–0.12)
BILIRUB UR QL STRIP.AUTO: NEGATIVE
BLD GP AB SCN SERPL QL: NORMAL
COLOR UR: ABNORMAL
EOSINOPHIL # BLD AUTO: 0.03 K/UL (ref 0–0.51)
EOSINOPHIL NFR BLD: 0.3 % (ref 0–6.9)
EPI CELLS #/AREA URNS HPF: ABNORMAL /HPF
ERYTHROCYTE [DISTWIDTH] IN BLOOD BY AUTOMATED COUNT: 44.8 FL (ref 35.9–50)
GLUCOSE UR STRIP.AUTO-MCNC: NEGATIVE MG/DL
HBV SURFACE AG SER QL: ABNORMAL
HCT VFR BLD AUTO: 41.4 % (ref 37–47)
HCV AB SER QL: NORMAL
HGB BLD-MCNC: 13.8 G/DL (ref 12–16)
HIV 1+2 AB+HIV1 P24 AG SERPL QL IA: NORMAL
HYALINE CASTS #/AREA URNS LPF: ABNORMAL /LPF
IMM GRANULOCYTES # BLD AUTO: 0.07 K/UL (ref 0–0.11)
IMM GRANULOCYTES NFR BLD AUTO: 0.6 % (ref 0–0.9)
KETONES UR STRIP.AUTO-MCNC: 15 MG/DL
LEUKOCYTE ESTERASE UR QL STRIP.AUTO: ABNORMAL
LYMPHOCYTES # BLD AUTO: 1.84 K/UL (ref 1–4.8)
LYMPHOCYTES NFR BLD: 16.5 % (ref 22–41)
MCH RBC QN AUTO: 30.5 PG (ref 27–33)
MCHC RBC AUTO-ENTMCNC: 33.3 G/DL (ref 33.6–35)
MCV RBC AUTO: 91.6 FL (ref 81.4–97.8)
MICRO URNS: ABNORMAL
MONOCYTES # BLD AUTO: 0.63 K/UL (ref 0–0.85)
MONOCYTES NFR BLD AUTO: 5.6 % (ref 0–13.4)
NEUTROPHILS # BLD AUTO: 8.55 K/UL (ref 2–7.15)
NEUTROPHILS NFR BLD: 76.5 % (ref 44–72)
NITRITE UR QL STRIP.AUTO: NEGATIVE
NRBC # BLD AUTO: 0 K/UL
NRBC BLD-RTO: 0 /100 WBC
PH UR STRIP.AUTO: 7 [PH] (ref 5–8)
PLATELET # BLD AUTO: 324 K/UL (ref 164–446)
PMV BLD AUTO: 9.9 FL (ref 9–12.9)
PROT UR QL STRIP: 30 MG/DL
RBC # BLD AUTO: 4.52 M/UL (ref 4.2–5.4)
RBC # URNS HPF: ABNORMAL /HPF
RBC UR QL AUTO: ABNORMAL
RH BLD: NORMAL
RUBV AB SER QL: 184 IU/ML
SP GR UR STRIP.AUTO: 1.02
TREPONEMA PALLIDUM IGG+IGM AB [PRESENCE] IN SERUM OR PLASMA BY IMMUNOASSAY: ABNORMAL
UROBILINOGEN UR STRIP.AUTO-MCNC: 0.2 MG/DL
WBC # BLD AUTO: 11.2 K/UL (ref 4.8–10.8)
WBC #/AREA URNS HPF: ABNORMAL /HPF

## 2020-11-03 PROCEDURE — 36415 COLL VENOUS BLD VENIPUNCTURE: CPT

## 2020-11-03 PROCEDURE — 86850 RBC ANTIBODY SCREEN: CPT

## 2020-11-03 PROCEDURE — 87086 URINE CULTURE/COLONY COUNT: CPT

## 2020-11-03 PROCEDURE — 86780 TREPONEMA PALLIDUM: CPT

## 2020-11-03 PROCEDURE — 86900 BLOOD TYPING SEROLOGIC ABO: CPT

## 2020-11-03 PROCEDURE — 87077 CULTURE AEROBIC IDENTIFY: CPT

## 2020-11-03 PROCEDURE — 85025 COMPLETE CBC W/AUTO DIFF WBC: CPT

## 2020-11-03 PROCEDURE — 87389 HIV-1 AG W/HIV-1&-2 AB AG IA: CPT

## 2020-11-03 PROCEDURE — 87340 HEPATITIS B SURFACE AG IA: CPT

## 2020-11-03 PROCEDURE — 86901 BLOOD TYPING SEROLOGIC RH(D): CPT

## 2020-11-03 PROCEDURE — 81001 URINALYSIS AUTO W/SCOPE: CPT

## 2020-11-03 PROCEDURE — 86803 HEPATITIS C AB TEST: CPT

## 2020-11-03 PROCEDURE — 86762 RUBELLA ANTIBODY: CPT

## 2020-11-05 LAB
BACTERIA UR CULT: ABNORMAL
BACTERIA UR CULT: ABNORMAL
SIGNIFICANT IND 70042: ABNORMAL
SITE SITE: ABNORMAL
SOURCE SOURCE: ABNORMAL

## 2021-01-21 ENCOUNTER — HOSPITAL ENCOUNTER (OUTPATIENT)
Dept: LAB | Facility: MEDICAL CENTER | Age: 31
End: 2021-01-21
Attending: OBSTETRICS & GYNECOLOGY
Payer: COMMERCIAL

## 2021-01-21 LAB
GLUCOSE 1H P 50 G GLC PO SERPL-MCNC: 106 MG/DL (ref 70–139)
HCT VFR BLD AUTO: 36.9 % (ref 37–47)
HGB BLD-MCNC: 12.1 G/DL (ref 12–16)
TREPONEMA PALLIDUM IGG+IGM AB [PRESENCE] IN SERUM OR PLASMA BY IMMUNOASSAY: NORMAL

## 2021-01-21 PROCEDURE — 85014 HEMATOCRIT: CPT

## 2021-01-21 PROCEDURE — 87086 URINE CULTURE/COLONY COUNT: CPT

## 2021-01-21 PROCEDURE — 86780 TREPONEMA PALLIDUM: CPT

## 2021-01-21 PROCEDURE — 36415 COLL VENOUS BLD VENIPUNCTURE: CPT

## 2021-01-21 PROCEDURE — 82950 GLUCOSE TEST: CPT

## 2021-01-21 PROCEDURE — 85018 HEMOGLOBIN: CPT

## 2021-01-23 LAB
BACTERIA UR CULT: NORMAL
SIGNIFICANT IND 70042: NORMAL
SITE SITE: NORMAL
SOURCE SOURCE: NORMAL

## 2021-02-05 ENCOUNTER — HOSPITAL ENCOUNTER (OUTPATIENT)
Facility: MEDICAL CENTER | Age: 31
End: 2021-02-05
Attending: OBSTETRICS & GYNECOLOGY
Payer: COMMERCIAL

## 2021-02-05 LAB — FIBRONECTIN FETAL SPEC QL: NEGATIVE

## 2021-02-05 PROCEDURE — 82731 ASSAY OF FETAL FIBRONECTIN: CPT

## 2021-03-29 LAB — STREP GP B DNA PCR: NEGATIVE

## 2021-04-19 ENCOUNTER — ANESTHESIA (OUTPATIENT)
Dept: ANESTHESIOLOGY | Facility: MEDICAL CENTER | Age: 31
End: 2021-04-19
Payer: COMMERCIAL

## 2021-04-19 ENCOUNTER — ANESTHESIA EVENT (OUTPATIENT)
Dept: ANESTHESIOLOGY | Facility: MEDICAL CENTER | Age: 31
End: 2021-04-19
Payer: COMMERCIAL

## 2021-04-19 ENCOUNTER — HOSPITAL ENCOUNTER (INPATIENT)
Facility: MEDICAL CENTER | Age: 31
LOS: 2 days | End: 2021-04-21
Attending: OBSTETRICS & GYNECOLOGY | Admitting: OBSTETRICS & GYNECOLOGY
Payer: COMMERCIAL

## 2021-04-19 LAB
ALBUMIN SERPL BCP-MCNC: 3.5 G/DL (ref 3.2–4.9)
ALBUMIN/GLOB SERPL: 1 G/DL
ALP SERPL-CCNC: 209 U/L (ref 30–99)
ALT SERPL-CCNC: 11 U/L (ref 2–50)
ANION GAP SERPL CALC-SCNC: 10 MMOL/L (ref 7–16)
AST SERPL-CCNC: 16 U/L (ref 12–45)
BASOPHILS # BLD AUTO: 0.4 % (ref 0–1.8)
BASOPHILS # BLD: 0.05 K/UL (ref 0–0.12)
BILIRUB SERPL-MCNC: <0.2 MG/DL (ref 0.1–1.5)
BUN SERPL-MCNC: 14 MG/DL (ref 8–22)
CALCIUM SERPL-MCNC: 9.4 MG/DL (ref 8.5–10.5)
CHLORIDE SERPL-SCNC: 102 MMOL/L (ref 96–112)
CO2 SERPL-SCNC: 22 MMOL/L (ref 20–33)
CREAT SERPL-MCNC: 0.79 MG/DL (ref 0.5–1.4)
CREAT UR-MCNC: 78.7 MG/DL
CRYSTALS AMN MICRO: NORMAL
EOSINOPHIL # BLD AUTO: 0.03 K/UL (ref 0–0.51)
EOSINOPHIL NFR BLD: 0.2 % (ref 0–6.9)
ERYTHROCYTE [DISTWIDTH] IN BLOOD BY AUTOMATED COUNT: 46.5 FL (ref 35.9–50)
GLOBULIN SER CALC-MCNC: 3.4 G/DL (ref 1.9–3.5)
GLUCOSE SERPL-MCNC: 90 MG/DL (ref 65–99)
HCT VFR BLD AUTO: 40 % (ref 37–47)
HGB BLD-MCNC: 13.1 G/DL (ref 12–16)
HOLDING TUBE BB 8507: NORMAL
IMM GRANULOCYTES # BLD AUTO: 0.11 K/UL (ref 0–0.11)
IMM GRANULOCYTES NFR BLD AUTO: 0.8 % (ref 0–0.9)
LYMPHOCYTES # BLD AUTO: 2.6 K/UL (ref 1–4.8)
LYMPHOCYTES NFR BLD: 20 % (ref 22–41)
MCH RBC QN AUTO: 30.6 PG (ref 27–33)
MCHC RBC AUTO-ENTMCNC: 32.8 G/DL (ref 33.6–35)
MCV RBC AUTO: 93.5 FL (ref 81.4–97.8)
MONOCYTES # BLD AUTO: 1.19 K/UL (ref 0–0.85)
MONOCYTES NFR BLD AUTO: 9.2 % (ref 0–13.4)
NEUTROPHILS # BLD AUTO: 9 K/UL (ref 2–7.15)
NEUTROPHILS NFR BLD: 69.4 % (ref 44–72)
NRBC # BLD AUTO: 0 K/UL
NRBC BLD-RTO: 0 /100 WBC
PLATELET # BLD AUTO: 315 K/UL (ref 164–446)
PMV BLD AUTO: 11.4 FL (ref 9–12.9)
POTASSIUM SERPL-SCNC: 4.6 MMOL/L (ref 3.6–5.5)
PROT SERPL-MCNC: 6.9 G/DL (ref 6–8.2)
PROT UR-MCNC: 17 MG/DL (ref 0–15)
PROT/CREAT UR: 216 MG/G (ref 10–107)
RBC # BLD AUTO: 4.28 M/UL (ref 4.2–5.4)
SARS-COV+SARS-COV-2 AG RESP QL IA.RAPID: NOTDETECTED
SARS-COV-2 RNA RESP QL NAA+PROBE: NOTDETECTED
SODIUM SERPL-SCNC: 134 MMOL/L (ref 135–145)
SPECIMEN SOURCE: NORMAL
SPECIMEN SOURCE: NORMAL
URATE SERPL-MCNC: 5.3 MG/DL (ref 1.9–8.2)
WBC # BLD AUTO: 13 K/UL (ref 4.8–10.8)

## 2021-04-19 PROCEDURE — 700105 HCHG RX REV CODE 258: Performed by: OBSTETRICS & GYNECOLOGY

## 2021-04-19 PROCEDURE — 36415 COLL VENOUS BLD VENIPUNCTURE: CPT

## 2021-04-19 PROCEDURE — 82570 ASSAY OF URINE CREATININE: CPT

## 2021-04-19 PROCEDURE — U0003 INFECTIOUS AGENT DETECTION BY NUCLEIC ACID (DNA OR RNA); SEVERE ACUTE RESPIRATORY SYNDROME CORONAVIRUS 2 (SARS-COV-2) (CORONAVIRUS DISEASE [COVID-19]), AMPLIFIED PROBE TECHNIQUE, MAKING USE OF HIGH THROUGHPUT TECHNOLOGIES AS DESCRIBED BY CMS-2020-01-R: HCPCS

## 2021-04-19 PROCEDURE — 700111 HCHG RX REV CODE 636 W/ 250 OVERRIDE (IP)

## 2021-04-19 PROCEDURE — 4A1H74Z MONITORING OF PRODUCTS OF CONCEPTION, CARDIAC ELECTRICAL ACTIVITY, VIA NATURAL OR ARTIFICIAL OPENING: ICD-10-PCS | Performed by: OBSTETRICS & GYNECOLOGY

## 2021-04-19 PROCEDURE — 10H073Z INSERTION OF MONITORING ELECTRODE INTO PRODUCTS OF CONCEPTION, VIA NATURAL OR ARTIFICIAL OPENING: ICD-10-PCS | Performed by: OBSTETRICS & GYNECOLOGY

## 2021-04-19 PROCEDURE — 3E033VJ INTRODUCTION OF OTHER HORMONE INTO PERIPHERAL VEIN, PERCUTANEOUS APPROACH: ICD-10-PCS | Performed by: OBSTETRICS & GYNECOLOGY

## 2021-04-19 PROCEDURE — 700111 HCHG RX REV CODE 636 W/ 250 OVERRIDE (IP): Performed by: OBSTETRICS & GYNECOLOGY

## 2021-04-19 PROCEDURE — 10H07YZ INSERTION OF OTHER DEVICE INTO PRODUCTS OF CONCEPTION, VIA NATURAL OR ARTIFICIAL OPENING: ICD-10-PCS | Performed by: OBSTETRICS & GYNECOLOGY

## 2021-04-19 PROCEDURE — 700101 HCHG RX REV CODE 250: Performed by: ADVANCED PRACTICE MIDWIFE

## 2021-04-19 PROCEDURE — 85025 COMPLETE CBC W/AUTO DIFF WBC: CPT

## 2021-04-19 PROCEDURE — 87426 SARSCOV CORONAVIRUS AG IA: CPT

## 2021-04-19 PROCEDURE — 80053 COMPREHEN METABOLIC PANEL: CPT

## 2021-04-19 PROCEDURE — 84156 ASSAY OF PROTEIN URINE: CPT

## 2021-04-19 PROCEDURE — 700111 HCHG RX REV CODE 636 W/ 250 OVERRIDE (IP): Performed by: ANESTHESIOLOGY

## 2021-04-19 PROCEDURE — 89060 EXAM SYNOVIAL FLUID CRYSTALS: CPT

## 2021-04-19 PROCEDURE — U0005 INFEC AGEN DETEC AMPLI PROBE: HCPCS

## 2021-04-19 PROCEDURE — A9270 NON-COVERED ITEM OR SERVICE: HCPCS | Performed by: ADVANCED PRACTICE MIDWIFE

## 2021-04-19 PROCEDURE — 700102 HCHG RX REV CODE 250 W/ 637 OVERRIDE(OP): Performed by: ADVANCED PRACTICE MIDWIFE

## 2021-04-19 PROCEDURE — 770002 HCHG ROOM/CARE - OB PRIVATE (112)

## 2021-04-19 PROCEDURE — 302449 STATCHG TRIAGE ONLY (STATISTIC)

## 2021-04-19 PROCEDURE — 84550 ASSAY OF BLOOD/URIC ACID: CPT

## 2021-04-19 RX ORDER — MAGNESIUM SULFATE HEPTAHYDRATE 40 MG/ML
2 INJECTION, SOLUTION INTRAVENOUS CONTINUOUS
Status: DISCONTINUED | OUTPATIENT
Start: 2021-04-19 | End: 2021-04-20

## 2021-04-19 RX ORDER — ONDANSETRON 2 MG/ML
4 INJECTION INTRAMUSCULAR; INTRAVENOUS EVERY 6 HOURS PRN
Status: DISCONTINUED | OUTPATIENT
Start: 2021-04-19 | End: 2021-04-20

## 2021-04-19 RX ORDER — MAGNESIUM SULFATE HEPTAHYDRATE 40 MG/ML
INJECTION, SOLUTION INTRAVENOUS
Status: COMPLETED
Start: 2021-04-19 | End: 2021-04-19

## 2021-04-19 RX ORDER — SODIUM CHLORIDE, SODIUM LACTATE, POTASSIUM CHLORIDE, CALCIUM CHLORIDE 600; 310; 30; 20 MG/100ML; MG/100ML; MG/100ML; MG/100ML
INJECTION, SOLUTION INTRAVENOUS CONTINUOUS
Status: ACTIVE | OUTPATIENT
Start: 2021-04-19 | End: 2021-04-20

## 2021-04-19 RX ORDER — CALCIUM GLUCONATE 94 MG/ML
1 INJECTION, SOLUTION INTRAVENOUS
Status: DISCONTINUED | OUTPATIENT
Start: 2021-04-19 | End: 2021-04-20

## 2021-04-19 RX ORDER — LABETALOL HYDROCHLORIDE 5 MG/ML
20-80 INJECTION, SOLUTION INTRAVENOUS PRN
Status: DISCONTINUED | OUTPATIENT
Start: 2021-04-19 | End: 2021-04-20

## 2021-04-19 RX ORDER — HYDRALAZINE HYDROCHLORIDE 20 MG/ML
5-10 INJECTION INTRAMUSCULAR; INTRAVENOUS PRN
Status: DISCONTINUED | OUTPATIENT
Start: 2021-04-19 | End: 2021-04-20

## 2021-04-19 RX ORDER — SODIUM CHLORIDE, SODIUM LACTATE, POTASSIUM CHLORIDE, AND CALCIUM CHLORIDE .6; .31; .03; .02 G/100ML; G/100ML; G/100ML; G/100ML
1000 INJECTION, SOLUTION INTRAVENOUS
Status: DISCONTINUED | OUTPATIENT
Start: 2021-04-19 | End: 2021-04-20 | Stop reason: HOSPADM

## 2021-04-19 RX ORDER — ROPIVACAINE HYDROCHLORIDE 2 MG/ML
INJECTION, SOLUTION EPIDURAL; INFILTRATION; PERINEURAL
Status: COMPLETED
Start: 2021-04-19 | End: 2021-04-19

## 2021-04-19 RX ORDER — SODIUM CHLORIDE, SODIUM LACTATE, POTASSIUM CHLORIDE, AND CALCIUM CHLORIDE .6; .31; .03; .02 G/100ML; G/100ML; G/100ML; G/100ML
250 INJECTION, SOLUTION INTRAVENOUS PRN
Status: DISCONTINUED | OUTPATIENT
Start: 2021-04-19 | End: 2021-04-20 | Stop reason: HOSPADM

## 2021-04-19 RX ORDER — BUPIVACAINE HYDROCHLORIDE 2.5 MG/ML
INJECTION, SOLUTION EPIDURAL; INFILTRATION; INTRACAUDAL
Status: COMPLETED
Start: 2021-04-19 | End: 2021-04-19

## 2021-04-19 RX ORDER — MAGNESIUM SULFATE HEPTAHYDRATE 40 MG/ML
4 INJECTION, SOLUTION INTRAVENOUS ONCE
Status: COMPLETED | OUTPATIENT
Start: 2021-04-19 | End: 2021-04-19

## 2021-04-19 RX ORDER — CALCIUM CARBONATE 500 MG/1
500 TABLET, CHEWABLE ORAL DAILY
Status: DISCONTINUED | OUTPATIENT
Start: 2021-04-20 | End: 2021-04-20

## 2021-04-19 RX ORDER — ROPIVACAINE HYDROCHLORIDE 2 MG/ML
INJECTION, SOLUTION EPIDURAL; INFILTRATION; PERINEURAL CONTINUOUS
Status: DISCONTINUED | OUTPATIENT
Start: 2021-04-19 | End: 2021-04-20

## 2021-04-19 RX ORDER — ACETAMINOPHEN 325 MG/1
650 TABLET ORAL EVERY 4 HOURS PRN
Status: DISCONTINUED | OUTPATIENT
Start: 2021-04-19 | End: 2021-04-21 | Stop reason: HOSPADM

## 2021-04-19 RX ORDER — MISOPROSTOL 200 UG/1
600 TABLET ORAL
Status: DISCONTINUED | OUTPATIENT
Start: 2021-04-19 | End: 2021-04-20 | Stop reason: HOSPADM

## 2021-04-19 RX ORDER — BUPIVACAINE HYDROCHLORIDE 2.5 MG/ML
INJECTION, SOLUTION EPIDURAL; INFILTRATION; INTRACAUDAL PRN
Status: DISCONTINUED | OUTPATIENT
Start: 2021-04-19 | End: 2021-04-20 | Stop reason: SURG

## 2021-04-19 RX ADMIN — LABETALOL HYDROCHLORIDE 20 MG: 5 INJECTION, SOLUTION INTRAVENOUS at 17:48

## 2021-04-19 RX ADMIN — MAGNESIUM SULFATE HEPTAHYDRATE 2 G/HR: 40 INJECTION, SOLUTION INTRAVENOUS at 20:24

## 2021-04-19 RX ADMIN — ROPIVACAINE HYDROCHLORIDE 200 MG: 2 INJECTION, SOLUTION EPIDURAL; INFILTRATION; PERINEURAL at 20:52

## 2021-04-19 RX ADMIN — Medication 2 MILLI-UNITS/MIN: at 20:45

## 2021-04-19 RX ADMIN — ROPIVACAINE HYDROCHLORIDE 200 MG: 2 INJECTION, SOLUTION EPIDURAL; INFILTRATION at 20:52

## 2021-04-19 RX ADMIN — BUPIVACAINE HYDROCHLORIDE 8 ML: 2.5 INJECTION, SOLUTION EPIDURAL; INFILTRATION; INTRACAUDAL; PERINEURAL at 20:50

## 2021-04-19 RX ADMIN — MAGNESIUM SULFATE IN WATER 4 G: 40 INJECTION, SOLUTION INTRAVENOUS at 20:05

## 2021-04-19 RX ADMIN — FENTANYL CITRATE 100 MCG: 50 INJECTION, SOLUTION INTRAMUSCULAR; INTRAVENOUS at 20:50

## 2021-04-19 RX ADMIN — SODIUM CHLORIDE, POTASSIUM CHLORIDE, SODIUM LACTATE AND CALCIUM CHLORIDE: 600; 310; 30; 20 INJECTION, SOLUTION INTRAVENOUS at 20:05

## 2021-04-19 RX ADMIN — SODIUM CHLORIDE, POTASSIUM CHLORIDE, SODIUM LACTATE AND CALCIUM CHLORIDE: 600; 310; 30; 20 INJECTION, SOLUTION INTRAVENOUS at 23:48

## 2021-04-19 RX ADMIN — ACETAMINOPHEN 650 MG: 325 TABLET, FILM COATED ORAL at 23:38

## 2021-04-19 RX ADMIN — MAGNESIUM SULFATE HEPTAHYDRATE 4 G: 40 INJECTION, SOLUTION INTRAVENOUS at 20:05

## 2021-04-19 ASSESSMENT — LIFESTYLE VARIABLES
EVER FELT BAD OR GUILTY ABOUT YOUR DRINKING: NO
ON A TYPICAL DAY WHEN YOU DRINK ALCOHOL HOW MANY DRINKS DO YOU HAVE: 0
HAVE PEOPLE ANNOYED YOU BY CRITICIZING YOUR DRINKING: NO
DOES PATIENT WANT TO STOP DRINKING: NO
EVER_SMOKED: NEVER
TOTAL SCORE: 0
ALCOHOL_USE: NO
EVER HAD A DRINK FIRST THING IN THE MORNING TO STEADY YOUR NERVES TO GET RID OF A HANGOVER: NO
AVERAGE NUMBER OF DAYS PER WEEK YOU HAVE A DRINK CONTAINING ALCOHOL: 0
TOTAL SCORE: 0
HAVE YOU EVER FELT YOU SHOULD CUT DOWN ON YOUR DRINKING: NO
TOTAL SCORE: 0
HOW MANY TIMES IN THE PAST YEAR HAVE YOU HAD 5 OR MORE DRINKS IN A DAY: 0
CONSUMPTION TOTAL: NEGATIVE

## 2021-04-19 ASSESSMENT — PATIENT HEALTH QUESTIONNAIRE - PHQ9
SUM OF ALL RESPONSES TO PHQ9 QUESTIONS 1 AND 2: 0
1. LITTLE INTEREST OR PLEASURE IN DOING THINGS: NOT AT ALL
2. FEELING DOWN, DEPRESSED, IRRITABLE, OR HOPELESS: NOT AT ALL

## 2021-04-19 ASSESSMENT — PAIN DESCRIPTION - PAIN TYPE: TYPE: ACUTE PAIN

## 2021-04-19 ASSESSMENT — COPD QUESTIONNAIRES
COPD SCREENING SCORE: 0
HAVE YOU SMOKED AT LEAST 100 CIGARETTES IN YOUR ENTIRE LIFE: NO/DON'T KNOW
DURING THE PAST 4 WEEKS HOW MUCH DID YOU FEEL SHORT OF BREATH: NONE/LITTLE OF THE TIME
IN THE PAST 12 MONTHS DO YOU DO LESS THAN YOU USED TO BECAUSE OF YOUR BREATHING PROBLEMS: DISAGREE/UNSURE
DO YOU EVER COUGH UP ANY MUCUS OR PHLEGM?: NO/ONLY WITH OCCASIONAL COLDS OR INFECTIONS

## 2021-04-19 NOTE — PROGRESS NOTES
EDC  37 6/     1640-pt presents from home with c/o SROM at 1350 today and uc's, states that she felt leaking fluid at 1350 with lots of gushes of fluid, states that strong uc's started at 1430, no c/o bleeding or other pain, states baby is moving normally, placed on external monitors, vs taken and set for q 10 min due to elevation, SSE performed, small amount of pooling noted, rosie slide prepared and sent, SVE /-2  1655-TC Dr Verde, report given, admission and PIH orders received  1720-report given to PAULO Love RN, POC discussed, transferred to LND

## 2021-04-20 LAB
ALBUMIN SERPL BCP-MCNC: 3.2 G/DL (ref 3.2–4.9)
ALBUMIN/GLOB SERPL: 1.1 G/DL
ALP SERPL-CCNC: 190 U/L (ref 30–99)
ALT SERPL-CCNC: 10 U/L (ref 2–50)
ANION GAP SERPL CALC-SCNC: 11 MMOL/L (ref 7–16)
AST SERPL-CCNC: 17 U/L (ref 12–45)
BILIRUB SERPL-MCNC: 0.2 MG/DL (ref 0.1–1.5)
BUN SERPL-MCNC: 15 MG/DL (ref 8–22)
CALCIUM SERPL-MCNC: 8.3 MG/DL (ref 8.5–10.5)
CHLORIDE SERPL-SCNC: 100 MMOL/L (ref 96–112)
CO2 SERPL-SCNC: 22 MMOL/L (ref 20–33)
CREAT SERPL-MCNC: 0.7 MG/DL (ref 0.5–1.4)
ERYTHROCYTE [DISTWIDTH] IN BLOOD BY AUTOMATED COUNT: 45.1 FL (ref 35.9–50)
ERYTHROCYTE [DISTWIDTH] IN BLOOD BY AUTOMATED COUNT: 45.7 FL (ref 35.9–50)
GLOBULIN SER CALC-MCNC: 2.9 G/DL (ref 1.9–3.5)
GLUCOSE SERPL-MCNC: 103 MG/DL (ref 65–99)
HCT VFR BLD AUTO: 36.6 % (ref 37–47)
HCT VFR BLD AUTO: 37.1 % (ref 37–47)
HGB BLD-MCNC: 12.1 G/DL (ref 12–16)
HGB BLD-MCNC: 12.1 G/DL (ref 12–16)
MAGNESIUM SERPL-MCNC: 5.1 MG/DL (ref 1.5–2.5)
MAGNESIUM SERPL-MCNC: 5.5 MG/DL (ref 1.5–2.5)
MAGNESIUM SERPL-MCNC: 5.8 MG/DL (ref 1.5–2.5)
MCH RBC QN AUTO: 30.4 PG (ref 27–33)
MCH RBC QN AUTO: 30.5 PG (ref 27–33)
MCHC RBC AUTO-ENTMCNC: 32.6 G/DL (ref 33.6–35)
MCHC RBC AUTO-ENTMCNC: 33.1 G/DL (ref 33.6–35)
MCV RBC AUTO: 92.2 FL (ref 81.4–97.8)
MCV RBC AUTO: 93.2 FL (ref 81.4–97.8)
PLATELET # BLD AUTO: 262 K/UL (ref 164–446)
PLATELET # BLD AUTO: 283 K/UL (ref 164–446)
PMV BLD AUTO: 10.7 FL (ref 9–12.9)
PMV BLD AUTO: 10.7 FL (ref 9–12.9)
POTASSIUM SERPL-SCNC: 4.2 MMOL/L (ref 3.6–5.5)
PROT SERPL-MCNC: 6.1 G/DL (ref 6–8.2)
RBC # BLD AUTO: 3.97 M/UL (ref 4.2–5.4)
RBC # BLD AUTO: 3.98 M/UL (ref 4.2–5.4)
SODIUM SERPL-SCNC: 133 MMOL/L (ref 135–145)
WBC # BLD AUTO: 16.8 K/UL (ref 4.8–10.8)
WBC # BLD AUTO: 24 K/UL (ref 4.8–10.8)

## 2021-04-20 PROCEDURE — A9270 NON-COVERED ITEM OR SERVICE: HCPCS | Performed by: ADVANCED PRACTICE MIDWIFE

## 2021-04-20 PROCEDURE — 700111 HCHG RX REV CODE 636 W/ 250 OVERRIDE (IP): Performed by: OBSTETRICS & GYNECOLOGY

## 2021-04-20 PROCEDURE — 700105 HCHG RX REV CODE 258: Performed by: ADVANCED PRACTICE MIDWIFE

## 2021-04-20 PROCEDURE — 80053 COMPREHEN METABOLIC PANEL: CPT

## 2021-04-20 PROCEDURE — 83735 ASSAY OF MAGNESIUM: CPT | Mod: 91

## 2021-04-20 PROCEDURE — 85027 COMPLETE CBC AUTOMATED: CPT | Mod: 91

## 2021-04-20 PROCEDURE — 700102 HCHG RX REV CODE 250 W/ 637 OVERRIDE(OP): Performed by: OBSTETRICS & GYNECOLOGY

## 2021-04-20 PROCEDURE — 36415 COLL VENOUS BLD VENIPUNCTURE: CPT

## 2021-04-20 PROCEDURE — A9270 NON-COVERED ITEM OR SERVICE: HCPCS | Performed by: OBSTETRICS & GYNECOLOGY

## 2021-04-20 PROCEDURE — 700102 HCHG RX REV CODE 250 W/ 637 OVERRIDE(OP): Performed by: ADVANCED PRACTICE MIDWIFE

## 2021-04-20 PROCEDURE — 304965 HCHG RECOVERY SERVICES

## 2021-04-20 PROCEDURE — 770002 HCHG ROOM/CARE - OB PRIVATE (112)

## 2021-04-20 PROCEDURE — 0KQM0ZZ REPAIR PERINEUM MUSCLE, OPEN APPROACH: ICD-10-PCS | Performed by: OBSTETRICS & GYNECOLOGY

## 2021-04-20 PROCEDURE — 59409 OBSTETRICAL CARE: CPT

## 2021-04-20 PROCEDURE — 303615 HCHG EPIDURAL/SPINAL ANESTHESIA FOR LABOR

## 2021-04-20 RX ORDER — CALCIUM CARBONATE 500 MG/1
500 TABLET, CHEWABLE ORAL DAILY
Status: DISCONTINUED | OUTPATIENT
Start: 2021-04-20 | End: 2021-04-21 | Stop reason: HOSPADM

## 2021-04-20 RX ORDER — OXYCODONE HYDROCHLORIDE AND ACETAMINOPHEN 5; 325 MG/1; MG/1
2 TABLET ORAL EVERY 4 HOURS PRN
Status: DISCONTINUED | OUTPATIENT
Start: 2021-04-20 | End: 2021-04-21 | Stop reason: HOSPADM

## 2021-04-20 RX ORDER — TERBUTALINE SULFATE 1 MG/ML
INJECTION, SOLUTION SUBCUTANEOUS
Status: ACTIVE
Start: 2021-04-20 | End: 2021-04-20

## 2021-04-20 RX ORDER — DEXTROSE, SODIUM CHLORIDE, SODIUM LACTATE, POTASSIUM CHLORIDE, AND CALCIUM CHLORIDE 5; .6; .31; .03; .02 G/100ML; G/100ML; G/100ML; G/100ML; G/100ML
INJECTION, SOLUTION INTRAVENOUS CONTINUOUS
Status: DISCONTINUED | OUTPATIENT
Start: 2021-04-20 | End: 2021-04-21 | Stop reason: HOSPADM

## 2021-04-20 RX ORDER — OXYCODONE HYDROCHLORIDE AND ACETAMINOPHEN 5; 325 MG/1; MG/1
1 TABLET ORAL EVERY 4 HOURS PRN
Status: DISCONTINUED | OUTPATIENT
Start: 2021-04-20 | End: 2021-04-21 | Stop reason: HOSPADM

## 2021-04-20 RX ORDER — ONDANSETRON 4 MG/1
4 TABLET, ORALLY DISINTEGRATING ORAL EVERY 6 HOURS PRN
Status: DISCONTINUED | OUTPATIENT
Start: 2021-04-20 | End: 2021-04-21 | Stop reason: HOSPADM

## 2021-04-20 RX ORDER — SODIUM CHLORIDE, SODIUM LACTATE, POTASSIUM CHLORIDE, CALCIUM CHLORIDE 600; 310; 30; 20 MG/100ML; MG/100ML; MG/100ML; MG/100ML
INJECTION, SOLUTION INTRAVENOUS PRN
Status: DISCONTINUED | OUTPATIENT
Start: 2021-04-20 | End: 2021-04-21 | Stop reason: HOSPADM

## 2021-04-20 RX ORDER — IBUPROFEN 600 MG/1
600 TABLET ORAL EVERY 6 HOURS PRN
Status: DISCONTINUED | OUTPATIENT
Start: 2021-04-20 | End: 2021-04-21 | Stop reason: HOSPADM

## 2021-04-20 RX ORDER — ONDANSETRON 2 MG/ML
4 INJECTION INTRAMUSCULAR; INTRAVENOUS EVERY 6 HOURS PRN
Status: DISCONTINUED | OUTPATIENT
Start: 2021-04-20 | End: 2021-04-21 | Stop reason: HOSPADM

## 2021-04-20 RX ADMIN — CALCIUM CARBONATE 500 MG: 500 TABLET, CHEWABLE ORAL at 00:09

## 2021-04-20 RX ADMIN — Medication 999 MILLI-UNITS/MIN: at 03:42

## 2021-04-20 RX ADMIN — OXYCODONE HYDROCHLORIDE AND ACETAMINOPHEN 1 TABLET: 5; 325 TABLET ORAL at 12:21

## 2021-04-20 RX ADMIN — ONDANSETRON 4 MG: 2 INJECTION INTRAMUSCULAR; INTRAVENOUS at 04:37

## 2021-04-20 RX ADMIN — SODIUM CHLORIDE, SODIUM LACTATE, POTASSIUM CHLORIDE, CALCIUM CHLORIDE AND DEXTROSE MONOHYDRATE: 5; 600; 310; 30; 20 INJECTION, SOLUTION INTRAVENOUS at 00:00

## 2021-04-20 RX ADMIN — OXYTOCIN 125 ML/HR: 10 INJECTION, SOLUTION INTRAMUSCULAR; INTRAVENOUS at 04:40

## 2021-04-20 RX ADMIN — IBUPROFEN 600 MG: 600 TABLET, FILM COATED ORAL at 11:05

## 2021-04-20 ASSESSMENT — PAIN DESCRIPTION - PAIN TYPE
TYPE: ACUTE PAIN

## 2021-04-20 NOTE — PROGRESS NOTES
2200- Report received from Bell ROMAN at , care assumeGestation at 37.6 Weeks      Pts Mother at bedside.     Denies ill feeling, reports FM.      Use of FM/Plain in place, discussed POC. Review of labor process/expectations, breathing/relaxation techniques - TBD as needed.      Patient deny questions/concerns regarding care since arrival to [facility].   RN contact information updated on the dry erase board, discussed.   Patient encouraged to call RN with all questions/concerns/needs.    2316 Jared CUTLER notified of decels, came to bedside to place IUPC/FSE internal monitors. Orders received to start 150ml LR bolus followed by maintenance D5LR.    0000 Call placed to lab to come for mag draw.     0149 Jared CUTLER notified of decels, at pt bedside. Pitocin off, O2 10L on, pt turned on L side.    0244 Pt prepped to push, provider at bedside. Provider/this RN continuously monitoring FHT. 10 L O2 on, Pt L wedge.    0342 Delivery of female infant, 7/9 APGARS. Gases ordered per Jared.     0700 Report given to Cesia ROMAN. VSS, pt denies questions/concerns at this time.

## 2021-04-20 NOTE — PROGRESS NOTES
0948: Per Dr. Sanches new orders received to discontinue patient IV magnesium sulfate @1300 on 04/20/2021.

## 2021-04-20 NOTE — H&P
"Subjective:       Grecia Mcgrath is a 30 y.o.  female with EDC 21 at 37 and 6/7 weeks gestation who is being admitted for labor management.  Her current obstetrical history is significant for PIH.  Patient reports contractions since 1500, leaking fluid since 1500. Fetal Movement: normal.      Objective:       /94   Pulse 82   Temp 36.7 °C (98.1 °F) (Temporal)   Resp 18   Ht 1.702 m (5' 7\")   Wt 90.3 kg (199 lb)   SpO2 98%   BMI 31.17 kg/m²     General:   no acute distress   Skin:   normal   HEENT:  PERRLA   Lungs:   CTA bilateral   Heart:   S1, S2 normal, no murmur, click, rub or gallop, regular rate and rhythm, brisk carotid upstroke without bruits, peripheral pulses very brisk, chest is clear without rales or wheezing, no pedal edema, no JVD, no hepatosplenomegaly   Breasts:   normal without suspicious masses, skin or nipple changes or axillary nodes, self-exam is taught and encouraged   Abdomen:  Abdomen soft, non-tender. BS normal. No masses,  No organomegaly   Pelvis:  Exam deferred.   FHT:  1 BPM   Uterine Size: S=D   Presentations: Cephalic   Cervix:     Dilation: 1cm    Effacement: 50%    Station:  -3    Consistency: Medium    Position: Middle     Lab Review   B+   AFP:NML   One hour GTT: Normal      Assessment:      37 and 6/7 weeks gestation.  Early latent labor with SROM x 3 hours  Obstetrical history significant for PIH with severe range  PIH labs normal.      Plan:       Dr. Sanches to manage PIH-MD updated and orders given for magnesium sulfate and labetelol.  Pitocin augmentation  Epidural on request  .   "

## 2021-04-20 NOTE — PROGRESS NOTES
29 y/o  presented at 37w5d with SROM and hypertension    EFM: category 1  Pelvic exam: /-2  Pt completed magnesium sulfate bolus per Dr. Sanches orders  Ctx-q5-7 min, moderate    Plan:  Epidural   Pitocin augmentation  Anticipate   Dr. Sanches updated on findings and plan of care

## 2021-04-20 NOTE — PROGRESS NOTES
"Report received from Cesia ROMAN. Patient transported to unit via wheelchair. Patient appears calm an in no acute distress.  ID bands and cuddles verified. Patient receiving IV magnesium sulfate, refer to MAR. Assessment completed. Fundus firm, light lochia noted.Dubon cath in place. SCDs On and in place.  at bedside. Patient oriented to room and call light.  Patient educated on keeping infant bundled up and/or skin-to-skin, safe sleep policy for infant, documenting infant void, stool, and feeding on clipboard, and infant feeding frequency, patient verbalizes understanding. Patient assisted in latching infant onto breast, multiple attempts made, infant unable to latch at this time, patient concerned and requesting to give infant formula at this time, patient concerns addressed and patient provided with Similac formula. Patient educated on the amount of formula to be given using the supplementation guidelines, patient verbalizes understanding. Patient educated on doing skin-to-skin and hand expressing patient verbalizes understanding. Patient educated to call prior to infant feeding in order to assist with feeding and assess infant blood glucose per glucose gel algorithm due to infant weight of 2.360 kg, patient verbalizes understanding. Plan of care discussed with patient; questions have been answered at this time. Patient needs have been met at this time. Patient encouraged to call when needing assistance. Call light within reach. Rounding in place. /77   Pulse 85   Temp 36.8 °C (98.3 °F) (Axillary)   Resp 18   Ht 1.702 m (5' 7\")   Wt 90.3 kg (199 lb)   SpO2 96%   Breastfeeding Yes   BMI 31.17 kg/m² .    "

## 2021-04-20 NOTE — ANESTHESIA TIME REPORT
Anesthesia Start and Stop Event Times     Date Time Event    4/19/2021 2041 Ready for Procedure     2041 Anesthesia Start    4/20/2021 0341 Anesthesia Stop        Responsible Staff  04/19/21 to 04/20/21    Name Role Begin End    Anderson Miranda M.D. Anesth 2041 0341        Preop Diagnosis (Free Text):  Pre-op Diagnosis             Preop Diagnosis (Codes):    Post op Diagnosis  Pain during labor      Premium Reason  A. 3PM - 7AM    Comments:

## 2021-04-20 NOTE — ANESTHESIA POSTPROCEDURE EVALUATION
Patient: Grecia Mcgrath    Procedure Summary     Date: 04/19/21 Room / Location:     Anesthesia Start: 2041 Anesthesia Stop: 04/20/21 0341    Procedure: Labor Epidural Diagnosis:     Scheduled Providers:  Responsible Provider: Anderson Miranda M.D.    Anesthesia Type: epidural ASA Status: 2          Final Anesthesia Type: epidural  Last vitals  BP   Blood Pressure: 122/60    Temp   35.8 °C (96.5 °F)    Pulse   74   Resp   18    SpO2   98 %      Anesthesia Post Evaluation    Patient location during evaluation: PACU  Patient participation: complete - patient participated  Level of consciousness: awake and alert    Airway patency: patent  Anesthetic complications: no  Cardiovascular status: hemodynamically stable  Respiratory status: acceptable  Hydration status: euvolemic    PONV: none          No complications documented.     Nurse Pain Score: 3 (NPRS)

## 2021-04-20 NOTE — CARE PLAN
Problem: Pain  Goal: Alleviation of Pain or a reduction in pain to the patient's comfort goal  Outcome: PROGRESSING AS EXPECTED  Note: Pt denies needs for pain intervention     Problem: Risk for Infection, Impaired Wound Healing  Goal: Remain free from signs and symptoms of infection  Outcome: PROGRESSING AS EXPECTED  Note: No s/s of infection noted, pt remains afebrile

## 2021-04-20 NOTE — LACTATION NOTE
"This note was copied from a baby's chart.  Baby 38 weeks,  (miscarriage x 1) , baby BW 5# 3.3 oz, MOB Hx G -HTN. Baby 10 hours old- no latch. MOB reports she desires to breastfeed. LC attempted to latch baby- breasts are challenging, nipples flat & areola's firmish- no latch . Discussed with mother initiating nipple shield 20 mm & pumping flange 25 mm. Educated mother on applying & cleaning NS. Initiated pumping after each breastfeed, speed 80 decrease to 60 after 2 minutes, suction 30% x 15 minutes then hand express x 2 minutes each breast.     Teaching on breastfeeding when baby shows hunger cues (no schedule), breastfeed a minimum 8 times in 24 hours no longer than 3-4 hours from last feed, importance of STS, positioning baby at breast nipple to nose & cluster feeding as normal. MOB watched \"Maximizing Milk\" & \"hand expression\" video's.     MOB has Medicaid [facility], will follow-up with [place] for breastfeeding support.    Breastfeeding plan:  1. Breastfeed with NS  2. Supplement according to guidelines as needed (MOB requested supplement)  3. Pump & hand express  Every 3-4 hours or sooner if baby cues (minimum 8 feedings in 24 hours)     "

## 2021-04-20 NOTE — ANESTHESIA PROCEDURE NOTES
Epidural Block    Date/Time: 4/19/2021 8:50 PM  Performed by: Anderson Miranda M.D.  Authorized by: Anderson Miranda M.D.     Patient Location:  OB  Start Time:  4/19/2021 8:50 PM  Reason for Block: labor analgesia    patient identified, IV checked, site marked, risks and benefits discussed, surgical consent, monitors and equipment checked, pre-op evaluation and timeout performed    Patient Position:  Sitting  Prep: ChloraPrep, patient draped and sterile technique    Monitoring:  Blood pressure, continuous pulse oximetry and heart rate  Approach:  Midline  Location:  L2-L3  Injection Technique:  SANKET air  Skin infiltration:  Lidocaine  Strength:  1%  Dose:  3ml  Needle Type:  Tuohy  Needle Gauge:  17 G  Needle Length:  3.5 in  Loss of resistance::  6  Catheter Size:  19 G  Catheter at Skin Depth:  11  Test Dose Result:  Negative

## 2021-04-20 NOTE — PROGRESS NOTES
29 y/o  presented at 37w5d with SROM and hypertension     EFM: baseline 120, moderate variability, accels present, early decels some ctx  Ctx: q5-7min  Cervical exam: 6/100%/-1  B/p 115/65     Plan:  Continue present course  Anticipate

## 2021-04-20 NOTE — PROGRESS NOTES
EDC - 21 EGA - 37.6    1720 - Report received from JOSE DANIEL Orta RN. IV started, labs drawn. Pt moved to 217 for labor. POC discussed with pt, all questions answered.    Covid swab obtained, admission procedures completed.   Labetalol given see MAR  1950 DAVID Pina CNM notified of high b/ps. CNM states RN needs to speak with Dr. Sanches regarding b/p   Dr. Sanches notified of high b/ps, orders to start magnesium and get and epidural.   RN at bedside assessing vital signs, Magnesium sulfate bolus started   DAVID Pina CNM at bedside, POC discussed, SVE /-2. Orders to start pitocin and bolus for epidural   - Dr. Miranda at bedside. Time out called at . Epidural placed at this time by Dr Miranda. Test dose at  - No reaction detected - VSS. Dermatome level of T8. Epidural infusion settings are : 10mL/hr continuous infusion, 5mL bolus every 15 minutes with a 25mL/hr dose limit.    - Dubon placed and draining to gravity. Turned to right side. No complaints at this time.    Report given to DAVID Ko RN

## 2021-04-20 NOTE — CARE PLAN
Problem: Altered physiologic condition related to immediate post-delivery state and potential for bleeding/hemorrhage  Goal: Patient physiologically stable as evidenced by normal lochia, palpable uterine involution and vital signs within normal limits  Outcome: PROGRESSING AS EXPECTED  Note: Fundus firm, light lochia noted. Monitoring and assessing patient vital signs.      Problem: Potential knowledge deficit related to lack of understanding of self and  care  Goal: Patient will verbalize understanding of self and infant care  Outcome: PROGRESSING AS EXPECTED  Note:  Patient educated on keeping infant bundled up and/or skin-to-skin, safe sleep policy for infant, documenting infant void, stool, and feeding on clipboard, and infant feeding frequency, patient verbalizes understanding. Patient educated to call when needing assistance.

## 2021-04-20 NOTE — PROGRESS NOTES
30 y.o.  female with EDC 21 at 37 and 6/7 weeks gestation . With gestatonal HTN came with ROM and contractions.   some readings elevated. Needing IV labetalol. PIH labs within normal limits. Blood pressure normal range after epidural and magnesium started.    Pt feels pressure.   vitals stable b/p well controlled.   EFM_ reassuring. regular contractions.   P/e= by Marisela- 9cms/-1 station.   plan- await vaginal delivery.

## 2021-04-20 NOTE — PROGRESS NOTES
0700. Report from Sarah ROMAN at the bedside. POC discussed and resumed. Pt has no needs at this time.    0820. Pt transferred to  in stable condition. Report to [place] ROMAN at the bedside.

## 2021-04-20 NOTE — PROGRESS NOTES
Prolonged decel to 80s. Resolved after repeated positions changes, oxygen by mask, and scalp stimulation. IV fluid bolus given. Pitocin discontinued  Dr. Sanches notified and states will come evaluate in person.  0200 baseline 115, moderate variability maintained, early decels noted with most ctx  Pelvic exam: 8/100%/+1

## 2021-04-20 NOTE — PROGRESS NOTES
29 y/o  presented at 37w5d with SROM and hypertension     EFM: baseline reduced 120  Repetitive variables to 90s-unclear early or late as toco not picking up ctx  Variability-moderate with occasional periods of minimal variability   accels present  Cervical exam:      Plan:  IUPC and fetal scalp electrode placed  Magnesium labs not yet drawn-call placed to lab  Fluid bolus and D5LR    0010 Single brief variable noted since internal monitors placed. Currently category 1 tracing with baseline 125

## 2021-04-21 ENCOUNTER — PHARMACY VISIT (OUTPATIENT)
Dept: PHARMACY | Facility: MEDICAL CENTER | Age: 31
End: 2021-04-21
Payer: MEDICARE

## 2021-04-21 VITALS
BODY MASS INDEX: 31.23 KG/M2 | WEIGHT: 199 LBS | HEIGHT: 67 IN | OXYGEN SATURATION: 98 % | TEMPERATURE: 98.9 F | HEART RATE: 63 BPM | RESPIRATION RATE: 20 BRPM | DIASTOLIC BLOOD PRESSURE: 71 MMHG | SYSTOLIC BLOOD PRESSURE: 125 MMHG

## 2021-04-21 PROCEDURE — A9270 NON-COVERED ITEM OR SERVICE: HCPCS | Performed by: ADVANCED PRACTICE MIDWIFE

## 2021-04-21 PROCEDURE — RXMED WILLOW AMBULATORY MEDICATION CHARGE: Performed by: OBSTETRICS & GYNECOLOGY

## 2021-04-21 PROCEDURE — 700102 HCHG RX REV CODE 250 W/ 637 OVERRIDE(OP): Performed by: OBSTETRICS & GYNECOLOGY

## 2021-04-21 PROCEDURE — A9270 NON-COVERED ITEM OR SERVICE: HCPCS | Performed by: OBSTETRICS & GYNECOLOGY

## 2021-04-21 PROCEDURE — 700102 HCHG RX REV CODE 250 W/ 637 OVERRIDE(OP): Performed by: ADVANCED PRACTICE MIDWIFE

## 2021-04-21 RX ORDER — IBUPROFEN 600 MG/1
600 TABLET ORAL EVERY 6 HOURS PRN
Qty: 30 TABLET | Refills: 1 | Status: SHIPPED | OUTPATIENT
Start: 2021-04-21

## 2021-04-21 RX ADMIN — IBUPROFEN 600 MG: 600 TABLET, FILM COATED ORAL at 00:02

## 2021-04-21 RX ADMIN — OXYCODONE AND ACETAMINOPHEN 2 TABLET: 5; 325 TABLET ORAL at 02:22

## 2021-04-21 RX ADMIN — CALCIUM CARBONATE 500 MG: 500 TABLET, CHEWABLE ORAL at 06:00

## 2021-04-21 RX ADMIN — IBUPROFEN 600 MG: 600 TABLET, FILM COATED ORAL at 08:20

## 2021-04-21 ASSESSMENT — EDINBURGH POSTNATAL DEPRESSION SCALE (EPDS)
I HAVE FELT SCARED OR PANICKY FOR NO GOOD REASON: NO, NOT AT ALL
I HAVE BEEN SO UNHAPPY THAT I HAVE HAD DIFFICULTY SLEEPING: NOT AT ALL
THINGS HAVE BEEN GETTING ON TOP OF ME: NO, I HAVE BEEN COPING AS WELL AS EVER
THE THOUGHT OF HARMING MYSELF HAS OCCURRED TO ME: NEVER
I HAVE BEEN ANXIOUS OR WORRIED FOR NO GOOD REASON: NO, NOT AT ALL
I HAVE LOOKED FORWARD WITH ENJOYMENT TO THINGS: AS MUCH AS I EVER DID
I HAVE FELT SAD OR MISERABLE: NO, NOT AT ALL
I HAVE BEEN SO UNHAPPY THAT I HAVE BEEN CRYING: NO, NEVER
I HAVE BEEN ABLE TO LAUGH AND SEE THE FUNNY SIDE OF THINGS: AS MUCH AS I ALWAYS COULD
I HAVE BLAMED MYSELF UNNECESSARILY WHEN THINGS WENT WRONG: NO, NEVER

## 2021-04-21 ASSESSMENT — PAIN DESCRIPTION - PAIN TYPE
TYPE: ACUTE PAIN

## 2021-04-21 NOTE — DISCHARGE INSTRUCTIONS
POSTPARTUM DISCHARGE INSTRUCTIONS FOR MOM    YOB: 1990   Age: 30 y.o.               Admit Date: 2021     Discharge Date: 2021  Attending Doctor:  Roshni Sanches M.D.                  Allergies:  Patient has no known allergies.    Discharged to home by car. Discharged via wheelchair, hospital escort: Yes.  Special equipment needed: Not Applicable  Belongings with: Personal  Be sure to schedule a follow-up appointment with your primary care doctor or any specialists as instructed.     Discharge Plan:   Diet Plan: Discussed  Activity Level: Discussed  Confirmed Follow up Appointment: Patient to Call and Schedule Appointment  Confirmed Symptoms Management: Discussed  Medication Reconciliation Updated: Yes    PATIENT DISCHARGE EDUCATION INSTRUCTION SHEET  REASONS TO CALL YOUR OBSTETRICIAN  · Persistent fever, shaking, chills (Temperature higher than 100.4) may indicate you have an infection  · Heavy bleeding: soaking more than 1 pad per hour; Passing clots an egg-sized clot or bigger may mean you have an postpartum hemorrhage  · Foul odor from vagina or bad smelling or discolored discharge or blood  · Breast infection (Mastitis symptoms); breast pain, chills, fever, redness or red streaks, may feel flu like symptoms  · Urinary pain, burning or frequency  · Incision that is not healing, increased redness, swelling, tenderness or pain, or any pus from episiotomy or  site may mean you have an infection  · Redness, swelling, warmth, or painful to touch in the calf area of your leg may mean you have a blood clot  · Severe or intensified depression, thoughts or feelings of wanting to hurt yourself or someone else   · Pain in chest, obstructed breathing or shortness of breath (trouble catching your breath) may mean you are having a postpartum complication. Call your provider immediately   · Headache that does not get better, even after taking medicine, a bad headache with vision changes or  pain in the upper right area of your belly may mean you have high blood pressure or post birth preeclampsia. Call your provider immediately    HAND WASHING  All family and friends should wash their hands:  · Before and after holding the baby  · Before feeding the baby  · After using the restroom or changing the baby's diaper    WOUND CARE  Ask your physician for additional care instructions. In general:  ·  Incision:  · May shower and pat incision dry   · Keep the incision clean and dry  · There should not be any opening or pus from the incision  · Continue to walk at home 3 times a day   · Do NOT lift anything heavier than your baby (over 10 pounds)  · Encourage family to help participate in care of the  to allow rest and mom time to heal  · Episiotomy/Laceration  · May use coy-spray bottle, witch hazel pads and dermaplast spray for comfort  · Use coy-spray bottle after urinating to cleanse perineal area  · To prevent burning during urination spray coy-water bottle on labial area   · Pat perineal area dry until episiotomy/laceration is healed  · Continue to use coy-bottle until bleeding stops as needed  · If have a 2nd degree laceration or greater, a Sitz bath can offer relief from soreness, burning, and inflammation   · Sitz Bath   · Sit in 6 inches of warm water and soak laceration as needed until the laceration heals    VAGINAL CARE AND BLEEDING  · Nothing inside vagina for 6 weeks:   · No sexual intercourse, tampons or douching  · Bleeding may continue for 2-4 weeks. Amount and color may vary  · Soaking 1 pad or more in an hour for several hours is considered heavy bleeding  · Passing large egg sized blood clots can be concerning  · If you feel like you have heavy bleeding or are having increasing amount of blood clots call your Obstetrician immediately  · If you begin feeling faint upon standing, feeling sick to your stomach, have clammy skin, a really fast heartbeat, have chills, start  "feeling confused, dizzy, sleepy or weak, or feeling like you're going to faint call your Obstetrician immediately    HYPERTENSION   Preeclampsia or gestational hypertension are types of high blood pressure that only pregnant women can get. It is important for you to be aware of symptoms to seek early intervention and treatment. If you have any of these symptoms immediately call your Obstetrician    · Vision changes or blurred vision   · Severe headache or pain that is unrelieved with medication and will not go away  · Persistent pain in upper abdomen or shoulder   · Increased swelling of face, feet, or hands  · Difficulty breathing or shortness of breath at rest  · Urinating less than usual    URINATION AND BOWEL MOVEMENTS  · Eating more fiber (bran cereal, fruits, and vegetables) and drinking plenty of fluids will help to avoid constipation  · Urinary frequency and urgency after childbirth is normal  · If you experience any urinary pain, burning or frequency call your provider    BIRTH CONTROL  · It is possible to become pregnant at any time after delivery and while breastfeeding  · Plan to discuss a method of birth control with your physician at your post delivery follow up visit    POSTPARTUM BLUES  During the first few days after birth, you may experience a sense of the \"blues\" which may include impatience, irritability or even crying. These feelings come and go quickly. However, as many as 1 in 10 women experience emotional symptoms known as postpartum depression.     POSTPARTUM DEPRESSION    May start as early as the second or third day after delivery or take several weeks or months to develop. Symptoms of \"blues\" are present, but are more intense: Crying spells; loss of appetite; feelings of hopelessness or loss of control; fear of touching the baby; over concern or no concern at all about the baby; little or no concern about your own appearance/caring for yourself; and/or inability to sleep or excessive " "sleeping. Contact your Obstetrician if you are experiencing any of these symptoms     PREVENTING SHAKEN BABY  If you are angry or stressed, PUT THE BABY IN THE CRIB, step away, take some deep breaths, and wait until you are calm to care for the baby. DO NOT SHAKE THE BABY. You are not alone, call a supporter for help.  · [facility] 24/7 crisis call line (879-528-3428) or (1-202.538.6031)  · You can also text them, text \"ANSWER\" (900074)      "

## 2021-04-21 NOTE — PROGRESS NOTES
"0700:Report received from Ellen ROMAN. Assumed Patient care. Patient appears calm and in no acute distress. Labs and orders reviewed.     0810:Assessment completed. Fundus firm, light lochia noted. Patient states 5/10 lower abdomen pain, patient requesting PRN pain medication, patient medicated refer to MAR. Patient educated to notify RN if pain is worsen, patient verbalizes understanding. Patient is noted to be attempting to breastfeed infant first using the nipple shield and then supplementing infant with similac formula along with using breast pump. Patient educated to call when needing assistance with breastfeeding, patient verbalizes understanding. Plan of care discussed with patient; questions have been answered at this time. Patient needs have been met at this time. Patient encouraged to call when needing assistance. Call light within reach. Rounding in place. /71   Pulse 63   Temp 37.2 °C (98.9 °F) (Temporal)   Resp 20   Ht 1.702 m (5' 7\")   Wt 90.3 kg (199 lb)   SpO2 98%   Breastfeeding Yes   BMI 31.17 kg/m² .   "

## 2021-04-21 NOTE — L&D DELIVERY NOTE
Delivery Note    Obstetrician:   Dr. Sanches    CNM: Marisela Betancur    Pre-Delivery Diagnosis:  at term     Post-Delivery Diagnosis: Living  infant-female    Procedure: Spontaneous vaginal delivery    29 y/o  presented in early latent labor with SROM and hypertension. Magnesium sulfate given per MD order due to b/p in severe range. With pitocin augmentation progressed to full dilation. Under epidural anesthesia delivered viable female infant from SARAH position, shoulders and body delivered with ease and infant placed on maternal abdomen. Apgars 7,9. Respiratory therapy in attendance due to episodes bradycardia and prolonged decels during labor. Pitocin prophylaxis begun, placenta delivered spontaneously.     Laceration: 2nd degree perineal laceration repaired with 3.0 vicryl    Indications for instrumental delivery: none    Infant Wt: 2360gm.  /   5 lbs,  3oz.          Apgars: 1' 7  /  5' 9     Placenta and Cord:          Mechanism: spontaneous        Description:  complete, 3 vessel cord    Estimated Blood Loss:  200 ml           Total IV Fluids: 1500ml           Specimens: cord blood gases           Complications:  hypertension           Condition: stable    Blood Type and Rh: B pos            Infant Feeding:    breast    Attending Attestation: I was present and scrubbed for the entire procedure.

## 2021-04-21 NOTE — CARE PLAN
Problem: Altered physiologic condition related to immediate post-delivery state and potential for bleeding/hemorrhage  Goal: Patient physiologically stable as evidenced by normal lochia, palpable uterine involution and vital signs within normal limits  Outcome: MET  Note: Fundus firm, light lochia noted. Slight edema noted to perineum, patient is using tucks and spray. Patient is able to ambulate and provide self perineal care. Monitoring and assessing patient vital signs.      Problem: Potential for postpartum infection related to presence of episiotomy/vaginal tear and/or uterine contamination  Goal: Patient will be absent from signs and symptoms of infection  Outcome: MET  Note: Patient exhibits no s/s of infection. Using standard precaution while providing care to patient. Patient demonstrate effective hand washing.      Problem: Alteration in comfort related to episiotomy, vaginal repair and/or after birth pains  Goal: Patient is able to ambulate, care for self and infant  Outcome: MET  Goal: Patient verbalizes acceptable pain level  Outcome: MET     Problem: Potential knowledge deficit related to lack of understanding of self and  care  Goal: Patient will verbalize understanding of self and infant care  Outcome: MET  Goal: Patient will demonstrate ability to care for self and infant  Outcome: MET     Problem: Potential anxiety related to difficulty adapting to parental role  Goal: Patient will verbalize and demonstrate effective bonding and parenting behavior  Outcome: MET

## 2021-04-21 NOTE — LACTATION NOTE
"This note was copied from a baby's chart.  Follow-up visit, couplet to be discharged today, plan continues for for home:    Breastfeeding plan:  1. Breastfeed with NS (may attempt latching without occassionally)  2. Supplement according to guidelines  3. Pump & hand express (feed back all EBM)  Every 3 hours or sooner if baby cues (minimum 8 feedings in 24 hours)    MOB reports she has [facility] [place], discussed with mother calling [facility] for \"interview\" this morning so she can be cleared to pick-up pump at discharge today. [address] [place] [address] paperwork sheet given- mother understands she needs to fill-out and take to [facility] to pick-up loaner HG pump through [facility]IC.    Supplemental Guidelines 10-20-30 given with review, MOB voiced understanding. Mother understands she will F/U with [facility] for OP lactation support.    Information sheets/video given with review:  1. Storage & Prep of BM, CDC  2. Supplemental Guidelines 10-20-30  3. Slow Pace Bottle feeding video    "

## 2021-04-21 NOTE — DISCHARGE SUMMARY
Discharge Summary:      Grecia Mcgrath      Admit Date:   2021  Discharge Date:  2021     Admitting diagnosis:  Labor and delivery, indication for care [O75.9]  Discharge Diagnosis: Status post vaginal, spontaneous.  Pregnancy Complications: gestational hypertension/PIH.  Tubal Ligation:  no        History:  Past Medical History:   Diagnosis Date   • Anemia    • GERD (gastroesophageal reflux disease)    • Ulcer of abdomen wall (HCC)      OB History    Para Term  AB Living   2 1 1   1 1   SAB TAB Ectopic Molar Multiple Live Births   1       0 1      # Outcome Date GA Lbr Chuck/2nd Weight Sex Delivery Anes PTL Lv   2 Term 21 38w0d / 01:06 2.36 kg (5 lb 3.3 oz) F Vag-Spont EPI N JADIEL   1 SAB                 Patient has no known allergies.  Patient Active Problem List    Diagnosis Date Noted   • Incomplete  2020        Hospital Course:   30 y.o. , now para 1, was admitted with the above mentioned diagnosis, underwent Active Labor, vaginal, spontaneous. Patient postpartum course was unremarkable, with progressive advancement in diet , ambulation and toleration of oral analgesia. Patient without complaints today and desires discharge.      Vitals:    21 1800 21 2200 21 0300 21 0600   BP: 138/76 121/75 126/78 125/71   Pulse: 98 91 73 63   Resp: 18 19 20 20   Temp: 37 °C (98.6 °F) 37.5 °C (99.5 °F) 37.3 °C (99.1 °F) 37.2 °C (98.9 °F)   TempSrc: Temporal Temporal Temporal Temporal   SpO2: 96% 97% 97% 98%   Weight:       Height:           Current Facility-Administered Medications   Medication Dose   • ondansetron (ZOFRAN ODT) dispertab 4 mg  4 mg    Or   • ondansetron (ZOFRAN) syringe/vial injection 4 mg  4 mg   • oxytocin (PITOCIN) infusion (for postpartum)   mL/hr   • ibuprofen (MOTRIN) tablet 600 mg  600 mg   • oxyCODONE-acetaminophen (PERCOCET) 5-325 MG per tablet 1 tablet  1 tablet   • oxyCODONE-acetaminophen (PERCOCET) 5-325 MG per  tablet 2 tablet  2 tablet   • calcium carbonate (TUMS) chewable tab 500 mg  500 mg   • D5LR infusion     • LR infusion     • measles, mumps and rubella vaccine (MMR) injection 0.5 mL  0.5 mL   • oxytocin (PITOCIN) infusion (for induction)  0.5-20 brenda-units/min   • acetaminophen (Tylenol) tablet 650 mg  650 mg       Exam:  Breast Exam: negative  Abdomen: Abdomen soft, non-tender. BS normal. No masses,  No organomegaly  Fundus Non Tender: no  Perineum: perineum intact  Extremity: extremities, peripheral pulses and reflexes normal, no edema, redness or tenderness in the calves or thighs     Labs:  Recent Labs     04/19/21  1720 04/20/21  0002 04/20/21  1155   WBC 13.0* 16.8* 24.0*   RBC 4.28 3.98* 3.97*   HEMOGLOBIN 13.1 12.1 12.1   HEMATOCRIT 40.0 37.1 36.6*   MCV 93.5 93.2 92.2   MCH 30.6 30.4 30.5   MCHC 32.8* 32.6* 33.1*   RDW 46.5 45.7 45.1   PLATELETCT 315 283 262   MPV 11.4 10.7 10.7        Activity:   Discharge to home  Pelvic Rest x 6 weeks    Assessment:  normal postpartum course  Discharge Assessment: No heavy bleeding or foul vaginal discharge      Follow up: 6weeks      Discharge Meds:   No current outpatient medications on file.       Roshni Sanches M.D.

## 2021-04-21 NOTE — DISCHARGE PLANNING
Meds-to-Beds: Discharge prescription order listed below delivered to patient's bedside. RN notified. Patient counseled.       Grecia Mcgrath   Home Medication Instructions [facility]:[id number]    Printed on:04/21/21 1151   Medication Information                      ibuprofen (MOTRIN) 600 MG Tab  Take 1 tablet by mouth every 6 hours as needed for up to 30 doses.               Lynda Sanchez, PharmD

## 2021-04-21 NOTE — CARE PLAN
Problem: Altered physiologic condition related to immediate post-delivery state and potential for bleeding/hemorrhage  Goal: Patient physiologically stable as evidenced by normal lochia, palpable uterine involution and vital signs within normal limits  Outcome: PROGRESSING AS EXPECTED  Note: Fundus firm. Lochia light. Will continue to monitor.      Problem: Potential for postpartum infection related to presence of episiotomy/vaginal tear and/or uterine contamination  Goal: Patient will be absent from signs and symptoms of infection  Outcome: PROGRESSING AS EXPECTED  Note: Afebrile. Slight edema to perineum area noted. Tucks and spray given.     Problem: Alteration in comfort related to episiotomy, vaginal repair and/or after birth pains  Goal: Patient is able to ambulate, care for self and infant  Outcome: PROGRESSING AS EXPECTED  Goal: Patient verbalizes acceptable pain level  Outcome: PROGRESSING AS EXPECTED  Note: Reports pain to be tolerable upon assessment

## 2021-04-21 NOTE — PROGRESS NOTES
Assessment completed. Slight edema to perineum are noted; tucks and spray given. Plan of care reviewed; verbalized understanding. Patient denies pain at this time. Instructed to call if needed pain intervention

## 2021-04-21 NOTE — PROGRESS NOTES
Discharge education and paperwork reviewed with patient, verbalizes understanding. Discharge paperwork signed.

## 2021-12-05 ENCOUNTER — NON-PROVIDER VISIT (OUTPATIENT)
Dept: URGENT CARE | Facility: CLINIC | Age: 31
End: 2021-12-05

## 2021-12-05 DIAGNOSIS — Z02.83 ENCOUNTER FOR DRUG SCREENING: Primary | ICD-10-CM

## 2021-12-05 DIAGNOSIS — Z02.1 PRE-EMPLOYMENT DRUG SCREENING: ICD-10-CM

## 2021-12-05 LAB
AMP AMPHETAMINE: NORMAL
COC COCAINE: NORMAL
INT CON NEG: NORMAL
INT CON POS: NORMAL
MET METHAMPHETAMINES: NORMAL
OPI OPIATES: NORMAL
PCP PHENCYCLIDINE: NORMAL
POC DRUG COMMENT 753798-OCCUPATIONAL HEALTH: NEGATIVE
THC: NORMAL

## 2021-12-05 PROCEDURE — 80305 DRUG TEST PRSMV DIR OPT OBS: CPT | Performed by: NURSE PRACTITIONER

## 2023-03-22 ENCOUNTER — OFFICE VISIT (OUTPATIENT)
Dept: URGENT CARE | Facility: PHYSICIAN GROUP | Age: 33
End: 2023-03-22
Payer: COMMERCIAL

## 2023-03-22 VITALS
SYSTOLIC BLOOD PRESSURE: 120 MMHG | BODY MASS INDEX: 26.87 KG/M2 | RESPIRATION RATE: 16 BRPM | HEART RATE: 65 BPM | OXYGEN SATURATION: 98 % | DIASTOLIC BLOOD PRESSURE: 60 MMHG | TEMPERATURE: 98 F | HEIGHT: 67 IN | WEIGHT: 171.2 LBS

## 2023-03-22 DIAGNOSIS — F41.1 GENERALIZED ANXIETY DISORDER: ICD-10-CM

## 2023-03-22 DIAGNOSIS — F33.1 MODERATE EPISODE OF RECURRENT MAJOR DEPRESSIVE DISORDER (HCC): ICD-10-CM

## 2023-03-22 PROCEDURE — 99214 OFFICE O/P EST MOD 30 MIN: CPT | Performed by: FAMILY MEDICINE

## 2023-03-22 RX ORDER — ESCITALOPRAM OXALATE 10 MG/1
10 TABLET ORAL DAILY
Qty: 30 TABLET | Refills: 0 | Status: SHIPPED | OUTPATIENT
Start: 2023-03-22 | End: 2023-03-31

## 2023-03-22 ASSESSMENT — ANXIETY QUESTIONNAIRES
2. NOT BEING ABLE TO STOP OR CONTROL WORRYING: NEARLY EVERY DAY
7. FEELING AFRAID AS IF SOMETHING AWFUL MIGHT HAPPEN: MORE THAN HALF THE DAYS
4. TROUBLE RELAXING: NEARLY EVERY DAY
GAD7 TOTAL SCORE: 19
1. FEELING NERVOUS, ANXIOUS, OR ON EDGE: NEARLY EVERY DAY
5. BEING SO RESTLESS THAT IT IS HARD TO SIT STILL: NEARLY EVERY DAY
3. WORRYING TOO MUCH ABOUT DIFFERENT THINGS: NEARLY EVERY DAY
6. BECOMING EASILY ANNOYED OR IRRITABLE: MORE THAN HALF THE DAYS

## 2023-03-22 ASSESSMENT — PATIENT HEALTH QUESTIONNAIRE - PHQ9
SUM OF ALL RESPONSES TO PHQ QUESTIONS 1-9: 20
5. POOR APPETITE OR OVEREATING: 3 - NEARLY EVERY DAY
CLINICAL INTERPRETATION OF PHQ2 SCORE: 5

## 2023-03-22 ASSESSMENT — FIBROSIS 4 INDEX: FIB4 SCORE: 0.7

## 2023-03-22 NOTE — LETTER
March 22, 2023    To Whom It May Concern:         This is confirmation that Grecia Ng Ramila attended her scheduled appointment with Lucero Fry M.D. on 3/22/23.  She may return to work on Friday without any restrictions.         If you have any questions please do not hesitate to call me at the phone number listed below.    Sincerely,          Lucero Fry M.D.  [phone]

## 2023-03-23 NOTE — PROGRESS NOTES
"  Subjective:      32 y.o. female presents to urgent care for anxiety and depression.  She does have a a history of both anxiety and depression, but has never been treated medically for this.  The last couple of weeks symptoms have worsened.  Mood has decreased.  She is having difficulty both with falling and staying asleep.  She is getting an average of 4 hours of sleep per night.  She also notes anxiety is up, but denies any panic attacks.  She denies alcohol or illicit drug use.  She denies any thoughts of suicide, self-harm, or harm to others.    She denies any other questions or concerns at this time.    Current problem list, medication, and past medical/surgical history were reviewed in Epic.    ROS  See HPI     Objective:      /60   Pulse 65   Temp 36.7 °C (98 °F) (Temporal)   Resp 16   Ht 1.702 m (5' 7\")   Wt 77.7 kg (171 lb 3.2 oz)   SpO2 98%   BMI 26.81 kg/m²     Physical Exam  Constitutional:       General: She is not in acute distress.     Appearance: She is not diaphoretic.   Cardiovascular:      Rate and Rhythm: Normal rate and regular rhythm.      Heart sounds: Normal heart sounds.   Pulmonary:      Effort: Pulmonary effort is normal. No respiratory distress.      Breath sounds: Normal breath sounds.   Neurological:      Mental Status: She is alert.   Psychiatric:         Mood and Affect: Affect normal.         Judgment: Judgment normal.     Assessment/Plan:     1. Moderate episode of recurrent major depressive disorder (HCC)  2. Generalized anxiety disorder  PHQ and RICH as below.  Acute on chronic problem.  She does not have a primary care provider, referral to establish care with 1 has been placed.  I have started her on Lexapro 10 mg daily.  Should her symptoms worsen or she become suicidal she agrees to go directly to the ED.  - Referral to establish with [facility] PCP  - escitalopram (LEXAPRO) 10 MG Tab; Take 1 Tablet by mouth every day.  Dispense: 30 Tablet; Refill: 0        3/22/2023 "     5:19 PM   RICH 7   RICH-7 Total Score 19         4/19/2021     5:41 PM 3/22/2023     4:45 PM   Depression Screen (PHQ-2/PHQ-9)   PHQ-2 Total Score 0    PHQ-2 Total Score  5   PHQ-9 Total Score  20         Instructed to return to Urgent Care or nearest Emergency Department if symptoms fail to improve, for any change in condition, further concerns, or new concerning symptoms. Patient states understanding of the plan of care and discharge instructions.    Lucero Fry M.D.

## 2023-03-24 SDOH — ECONOMIC STABILITY: TRANSPORTATION INSECURITY
IN THE PAST 12 MONTHS, HAS THE LACK OF TRANSPORTATION KEPT YOU FROM MEDICAL APPOINTMENTS OR FROM GETTING MEDICATIONS?: NO

## 2023-03-24 SDOH — ECONOMIC STABILITY: HOUSING INSECURITY
IN THE LAST 12 MONTHS, WAS THERE A TIME WHEN YOU DID NOT HAVE A STEADY PLACE TO SLEEP OR SLEPT IN A SHELTER (INCLUDING NOW)?: NO

## 2023-03-24 SDOH — ECONOMIC STABILITY: INCOME INSECURITY: HOW HARD IS IT FOR YOU TO PAY FOR THE VERY BASICS LIKE FOOD, HOUSING, MEDICAL CARE, AND HEATING?: PATIENT DECLINED

## 2023-03-24 SDOH — ECONOMIC STABILITY: INCOME INSECURITY: IN THE LAST 12 MONTHS, WAS THERE A TIME WHEN YOU WERE NOT ABLE TO PAY THE MORTGAGE OR RENT ON TIME?: NO

## 2023-03-24 SDOH — ECONOMIC STABILITY: FOOD INSECURITY: WITHIN THE PAST 12 MONTHS, YOU WORRIED THAT YOUR FOOD WOULD RUN OUT BEFORE YOU GOT MONEY TO BUY MORE.: NEVER TRUE

## 2023-03-24 SDOH — HEALTH STABILITY: PHYSICAL HEALTH: ON AVERAGE, HOW MANY DAYS PER WEEK DO YOU ENGAGE IN MODERATE TO STRENUOUS EXERCISE (LIKE A BRISK WALK)?: 6 DAYS

## 2023-03-24 SDOH — ECONOMIC STABILITY: HOUSING INSECURITY: IN THE LAST 12 MONTHS, HOW MANY PLACES HAVE YOU LIVED?: 1

## 2023-03-24 SDOH — ECONOMIC STABILITY: TRANSPORTATION INSECURITY
IN THE PAST 12 MONTHS, HAS LACK OF RELIABLE TRANSPORTATION KEPT YOU FROM MEDICAL APPOINTMENTS, MEETINGS, WORK OR FROM GETTING THINGS NEEDED FOR DAILY LIVING?: NO

## 2023-03-24 SDOH — HEALTH STABILITY: PHYSICAL HEALTH: ON AVERAGE, HOW MANY MINUTES DO YOU ENGAGE IN EXERCISE AT THIS LEVEL?: PATIENT DECLINED

## 2023-03-24 SDOH — HEALTH STABILITY: MENTAL HEALTH
STRESS IS WHEN SOMEONE FEELS TENSE, NERVOUS, ANXIOUS, OR CAN'T SLEEP AT NIGHT BECAUSE THEIR MIND IS TROUBLED. HOW STRESSED ARE YOU?: VERY MUCH

## 2023-03-24 SDOH — ECONOMIC STABILITY: TRANSPORTATION INSECURITY
IN THE PAST 12 MONTHS, HAS LACK OF TRANSPORTATION KEPT YOU FROM MEETINGS, WORK, OR FROM GETTING THINGS NEEDED FOR DAILY LIVING?: NO

## 2023-03-24 SDOH — ECONOMIC STABILITY: FOOD INSECURITY: WITHIN THE PAST 12 MONTHS, THE FOOD YOU BOUGHT JUST DIDN'T LAST AND YOU DIDN'T HAVE MONEY TO GET MORE.: NEVER TRUE

## 2023-03-24 ASSESSMENT — SOCIAL DETERMINANTS OF HEALTH (SDOH)
IN A TYPICAL WEEK, HOW MANY TIMES DO YOU TALK ON THE PHONE WITH FAMILY, FRIENDS, OR NEIGHBORS?: TWICE A WEEK
HOW OFTEN DO YOU HAVE A DRINK CONTAINING ALCOHOL: NEVER
IN A TYPICAL WEEK, HOW MANY TIMES DO YOU TALK ON THE PHONE WITH FAMILY, FRIENDS, OR NEIGHBORS?: TWICE A WEEK
HOW OFTEN DO YOU HAVE SIX OR MORE DRINKS ON ONE OCCASION: NEVER
HOW OFTEN DO YOU ATTEND CHURCH OR RELIGIOUS SERVICES?: PATIENT DECLINED
WITHIN THE PAST 12 MONTHS, YOU WORRIED THAT YOUR FOOD WOULD RUN OUT BEFORE YOU GOT THE MONEY TO BUY MORE: NEVER TRUE
DO YOU BELONG TO ANY CLUBS OR ORGANIZATIONS SUCH AS CHURCH GROUPS UNIONS, FRATERNAL OR ATHLETIC GROUPS, OR SCHOOL GROUPS?: NO
HOW OFTEN DO YOU ATTEND CHURCH OR RELIGIOUS SERVICES?: PATIENT DECLINED
HOW OFTEN DO YOU ATTENT MEETINGS OF THE CLUB OR ORGANIZATION YOU BELONG TO?: NEVER
HOW MANY DRINKS CONTAINING ALCOHOL DO YOU HAVE ON A TYPICAL DAY WHEN YOU ARE DRINKING: PATIENT DOES NOT DRINK
HOW OFTEN DO YOU GET TOGETHER WITH FRIENDS OR RELATIVES?: ONCE A WEEK
HOW OFTEN DO YOU GET TOGETHER WITH FRIENDS OR RELATIVES?: ONCE A WEEK
HOW HARD IS IT FOR YOU TO PAY FOR THE VERY BASICS LIKE FOOD, HOUSING, MEDICAL CARE, AND HEATING?: PATIENT DECLINED
HOW OFTEN DO YOU ATTENT MEETINGS OF THE CLUB OR ORGANIZATION YOU BELONG TO?: NEVER
DO YOU BELONG TO ANY CLUBS OR ORGANIZATIONS SUCH AS CHURCH GROUPS UNIONS, FRATERNAL OR ATHLETIC GROUPS, OR SCHOOL GROUPS?: NO

## 2023-03-24 ASSESSMENT — LIFESTYLE VARIABLES
HOW MANY STANDARD DRINKS CONTAINING ALCOHOL DO YOU HAVE ON A TYPICAL DAY: PATIENT DOES NOT DRINK
SKIP TO QUESTIONS 9-10: 1
HOW OFTEN DO YOU HAVE SIX OR MORE DRINKS ON ONE OCCASION: NEVER
AUDIT-C TOTAL SCORE: 0
HOW OFTEN DO YOU HAVE A DRINK CONTAINING ALCOHOL: NEVER

## 2023-03-31 ENCOUNTER — OFFICE VISIT (OUTPATIENT)
Dept: MEDICAL GROUP | Facility: PHYSICIAN GROUP | Age: 33
End: 2023-03-31
Payer: COMMERCIAL

## 2023-03-31 VITALS
WEIGHT: 172 LBS | TEMPERATURE: 97.3 F | SYSTOLIC BLOOD PRESSURE: 120 MMHG | OXYGEN SATURATION: 97 % | BODY MASS INDEX: 27 KG/M2 | HEART RATE: 97 BPM | RESPIRATION RATE: 16 BRPM | DIASTOLIC BLOOD PRESSURE: 76 MMHG | HEIGHT: 67 IN

## 2023-03-31 DIAGNOSIS — Z87.59 HISTORY OF PLACENTA PREVIA: ICD-10-CM

## 2023-03-31 DIAGNOSIS — F32.A DEPRESSION, UNSPECIFIED DEPRESSION TYPE: ICD-10-CM

## 2023-03-31 DIAGNOSIS — Z13.220 SCREENING FOR LIPID DISORDERS: ICD-10-CM

## 2023-03-31 DIAGNOSIS — R51.9 NONINTRACTABLE HEADACHE, UNSPECIFIED CHRONICITY PATTERN, UNSPECIFIED HEADACHE TYPE: ICD-10-CM

## 2023-03-31 PROCEDURE — 99214 OFFICE O/P EST MOD 30 MIN: CPT | Performed by: NURSE PRACTITIONER

## 2023-03-31 ASSESSMENT — ENCOUNTER SYMPTOMS
FEVER: 0
CHILLS: 0
HEADACHES: 1
NERVOUS/ANXIOUS: 1
DEPRESSION: 1

## 2023-03-31 ASSESSMENT — FIBROSIS 4 INDEX: FIB4 SCORE: 0.7

## 2023-03-31 NOTE — LETTER
March 31, 2023    To Whom It May Concern:         This is confirmation that Grecia Mcgrath attended her scheduled appointment with MARYBEL Lott on 3/31/23. She can only work 40 hours per week due to her medical condition.          If you have any questions please do not hesitate to call me at the phone number listed below.    Sincerely,          CECILIA Lott.  [phone]

## 2023-03-31 NOTE — PROGRESS NOTES
Chief Complaint   Patient presents with    [facility]     Depression, lack of sleep, 1 year      Subjective:     Grecia Mcgrath is a 32 y.o. female presenting for      Problem   Depression    Onset over the last year  Increase in weight  Sad  anxious  Notes she is working a lot and needing to work mandatory overtime  Feels like she is not spending enough time at home   Does not see her  as much  Notes she thinks this was around the same time as her nexplanon placed  Patient states that she was recently seen last week and prescribed escitalopram but is concerned if this medication is appropriate for her as she is interested in getting pregnant again.    Patient also plans to have her Nexplanon removed as well.      History of Placenta Previa    With most recent pregnacy from her daghter in April 2021     Headache    Onset 1 month ago   Notes that the headaches around her whole head.  She has tried Tylenol as well as Advil in the past with some improvement.  She states occasionally she gets dizziness with her headache, photosensitivity and nausea.  She denies change in vision, auras or vomiting.           Review of Systems   Constitutional:  Negative for chills and fever.   Neurological:  Positive for headaches.   Psychiatric/Behavioral:  Positive for depression. Negative for suicidal ideas. The patient is nervous/anxious.         Current Outpatient Medications:     ibuprofen (MOTRIN) 600 MG Tab, Take 1 tablet by mouth every 6 hours as needed for up to 30 doses., Disp: 30 tablet, Rfl: 1    Prenatal MV-Min-FA-Omega-3 (PRENATAL GUMMIES/DHA & FA PO), Take 2 Tabs by mouth every day. (Patient not taking: Reported on 3/31/2023), Disp: , Rfl:     Past Medical History:   Diagnosis Date    Anemia     Anxiety     Depression     GERD (gastroesophageal reflux disease)     Hypertension     Migraine     Ulcer of abdomen wall (HCC)        History reviewed. No pertinent surgical history.    Social History     Tobacco  "Use    Smoking status: Never    Smokeless tobacco: Never   Vaping Use    Vaping Use: Never used   Substance Use Topics    Alcohol use: No    Drug use: Never       Family History   Problem Relation Age of Onset    Other Mother         sleap apnea    Hyperlipidemia Mother     Diabetes Father     Cancer Maternal Grandmother         stomach    Heart Disease Paternal Grandfather     No Known Problems Sister        Patient has no known allergies.    Allergies, past medical history, past surgical history, family history, social history reviewed and updated    Objective:     Vitals: /76 (BP Location: Right arm, Patient Position: Sitting, BP Cuff Size: Adult)   Pulse 97   Temp 36.3 °C (97.3 °F) (Temporal)   Resp 16   Ht 1.702 m (5' 7\")   Wt 78 kg (172 lb)   LMP 02/15/2023 (Within Weeks)   SpO2 97%   Breastfeeding No   BMI 26.94 kg/m²     Physical Exam  Constitutional:       Appearance: Normal appearance. She is normal weight.   HENT:      Head: Normocephalic and atraumatic.      Right Ear: Tympanic membrane, ear canal and external ear normal.      Left Ear: Tympanic membrane, ear canal and external ear normal.      Nose: Nose normal.      Mouth/Throat:      Mouth: Mucous membranes are moist.      Pharynx: Oropharynx is clear.   Eyes:      Extraocular Movements: Extraocular movements intact.      Conjunctiva/sclera: Conjunctivae normal.   Cardiovascular:      Rate and Rhythm: Normal rate and regular rhythm.      Pulses: Normal pulses.      Heart sounds: Normal heart sounds.   Pulmonary:      Effort: Pulmonary effort is normal.      Breath sounds: Normal breath sounds.   Musculoskeletal:      Cervical back: Normal range of motion.   Skin:     General: Skin is warm.      Capillary Refill: Capillary refill takes less than 2 seconds.   Neurological:      Mental Status: She is alert and oriented to person, place, and time.   Psychiatric:         Mood and Affect: Mood normal.         Behavior: Behavior normal.        "  Thought Content: Thought content normal.         Judgment: Judgment normal.       Assessment/Plan:     1. Depression, unspecified depression type  Chronic and stable condition  - CBC WITHOUT DIFFERENTIAL; Future  - Comp Metabolic Panel; Future  - TSH; Future  - FREE THYROXINE; Future    2. History of placenta previa  Chronic stable condition    3. Nonintractable headache, unspecified chronicity pattern, unspecified headache type  Acute uncomplicated condition  - CBC WITHOUT DIFFERENTIAL; Future  - Comp Metabolic Panel; Future  - TSH; Future  - FREE THYROXINE; Future    4. Screening for lipid disorders  - Lipid Profile; Future       Discussed with patient possible alternative diagnoses, patient is to take all medications as prescribed.     If symptoms persist FU w/PCP, if symptoms worsen go to emergency room.     If experiencing any side effects from prescribed medications reports to the office immediately or go to emergency room.    Reviewed indication, dosage, usage and potential adverse effects of prescribed medications.     Reviewed risks and benefits of treatment plan. Patient verbalizes understanding of all instruction and verbally agrees to plan.    Discussed plan with the patient, and she agrees to the above.      I personally reviewed prior external notes and test results pertinent to today's visit.        Return in about 6 weeks (around 5/12/2023) for follow up depression .      Please note that this dictation was created using voice recognition software. I have made every reasonable attempt to correct obvious errors, but I expect that there may be errors of grammar and possibly content that I did not discover before finalizing the note.

## 2023-04-03 ENCOUNTER — TELEPHONE (OUTPATIENT)
Dept: MEDICAL GROUP | Facility: PHYSICIAN GROUP | Age: 33
End: 2023-04-03
Payer: COMMERCIAL

## 2023-04-03 NOTE — TELEPHONE ENCOUNTER
Patient called office in regards to her last visit with PCP. She states Nuvia was going to send in a new rx to the [facility] pharmacy in [place] for her depression. She states there was no prescription at her pharmacy. She was informed to stop taking her Lexapro medication.    Please advise.

## 2023-04-04 DIAGNOSIS — F32.A DEPRESSION, UNSPECIFIED DEPRESSION TYPE: ICD-10-CM

## 2023-04-04 RX ORDER — SERTRALINE HYDROCHLORIDE 25 MG/1
25 TABLET, FILM COATED ORAL DAILY
Qty: 30 TABLET | Refills: 1 | Status: SHIPPED | OUTPATIENT
Start: 2023-04-04 | End: 2023-06-06

## 2023-05-19 ENCOUNTER — TELEPHONE (OUTPATIENT)
Dept: MEDICAL GROUP | Facility: PHYSICIAN GROUP | Age: 33
End: 2023-05-19
Payer: COMMERCIAL

## 2023-05-19 NOTE — TELEPHONE ENCOUNTER
Patient lvm stating she has not had her menstrual cycle since starting sertraline she was suppose to have it 5/7. She did take a pregnancy test but came back negative.

## 2023-05-25 ENCOUNTER — TELEPHONE (OUTPATIENT)
Dept: MEDICAL GROUP | Facility: PHYSICIAN GROUP | Age: 33
End: 2023-05-25
Payer: COMMERCIAL

## 2023-05-25 NOTE — TELEPHONE ENCOUNTER
Left message for patient to schedule a follow up to discuss concerns. Left call back number [phone]

## 2023-06-04 DIAGNOSIS — F32.A DEPRESSION, UNSPECIFIED DEPRESSION TYPE: ICD-10-CM

## 2023-06-05 NOTE — TELEPHONE ENCOUNTER
Received request via: Patient    Was the patient seen in the last year in this department? Yes    Does the patient have an active prescription (recently filled or refills available) for medication(s) requested? No    Does the patient have retirement Plus and need 100 day supply (blood pressure, diabetes and cholesterol meds only)? Np PT DOES NOT HAVE SCP

## 2023-06-06 RX ORDER — SERTRALINE HYDROCHLORIDE 25 MG/1
TABLET, FILM COATED ORAL
Qty: 30 TABLET | Refills: 1 | Status: SHIPPED | OUTPATIENT
Start: 2023-06-06 | End: 2023-08-01

## 2023-08-01 DIAGNOSIS — F32.A DEPRESSION, UNSPECIFIED DEPRESSION TYPE: ICD-10-CM

## 2023-08-01 RX ORDER — SERTRALINE HYDROCHLORIDE 25 MG/1
25 TABLET, FILM COATED ORAL DAILY
Qty: 30 TABLET | Refills: 1 | Status: SHIPPED | OUTPATIENT
Start: 2023-08-01

## 2023-08-01 NOTE — TELEPHONE ENCOUNTER
Received request via: Pharmacy    Was the patient seen in the last year in this department? Yes    Does the patient have an active prescription (recently filled or refills available) for medication(s) requested? No    Does the patient have USP Plus and need 100 day supply (blood pressure, diabetes and cholesterol meds only)? Patient does not have SCP    Last office visit 03/31/23  Last labs 07/26/23